# Patient Record
Sex: FEMALE
[De-identification: names, ages, dates, MRNs, and addresses within clinical notes are randomized per-mention and may not be internally consistent; named-entity substitution may affect disease eponyms.]

---

## 2017-12-10 ENCOUNTER — HOSPITAL ENCOUNTER (OUTPATIENT)
Dept: HOSPITAL 42 - ED | Age: 77
Setting detail: OBSERVATION
LOS: 1 days | Discharge: HOME | End: 2017-12-11
Attending: STUDENT IN AN ORGANIZED HEALTH CARE EDUCATION/TRAINING PROGRAM | Admitting: STUDENT IN AN ORGANIZED HEALTH CARE EDUCATION/TRAINING PROGRAM
Payer: MEDICARE

## 2017-12-10 VITALS — OXYGEN SATURATION: 100 %

## 2017-12-10 VITALS — BODY MASS INDEX: 15.1 KG/M2

## 2017-12-10 VITALS — RESPIRATION RATE: 18 BRPM

## 2017-12-10 DIAGNOSIS — R47.01: ICD-10-CM

## 2017-12-10 DIAGNOSIS — Z90.49: ICD-10-CM

## 2017-12-10 DIAGNOSIS — I73.9: ICD-10-CM

## 2017-12-10 DIAGNOSIS — E16.2: Primary | ICD-10-CM

## 2017-12-10 DIAGNOSIS — Z93.2: ICD-10-CM

## 2017-12-10 DIAGNOSIS — D63.8: ICD-10-CM

## 2017-12-10 DIAGNOSIS — I25.10: ICD-10-CM

## 2017-12-10 DIAGNOSIS — R47.1: ICD-10-CM

## 2017-12-10 DIAGNOSIS — I25.2: ICD-10-CM

## 2017-12-10 LAB
ALBUMIN/GLOB SERPL: 1.2 {RATIO} (ref 1.1–1.8)
ALP SERPL-CCNC: 480 U/L (ref 38–126)
ALT SERPL-CCNC: 52 U/L (ref 7–56)
APPEARANCE UR: (no result)
APTT BLD: 33.4 SECONDS (ref 25.1–36.5)
AST SERPL-CCNC: 147 U/L (ref 14–36)
BACTERIA #/AREA URNS HPF: (no result) /[HPF]
BASOPHILS # BLD AUTO: 0.05 K/MM3 (ref 0–2)
BASOPHILS NFR BLD: 0.5 % (ref 0–3)
BILIRUB SERPL-MCNC: 0.9 MG/DL (ref 0.2–1.3)
BILIRUB UR-MCNC: NEGATIVE MG/DL
BUN SERPL-MCNC: 18 MG/DL (ref 7–21)
CALCIUM SERPL-MCNC: 9 MG/DL (ref 8.4–10.5)
CHLORIDE SERPL-SCNC: 107 MMOL/L (ref 98–107)
CO2 SERPL-SCNC: 15 MMOL/L (ref 21–33)
COLOR UR: YELLOW
EOSINOPHIL # BLD: 0 10*3/UL (ref 0–0.7)
EOSINOPHIL NFR BLD: 0 % (ref 1.5–5)
ERYTHROCYTE [DISTWIDTH] IN BLOOD BY AUTOMATED COUNT: 18.7 % (ref 11.5–14.5)
GLOBULIN SER-MCNC: 3.3 GM/DL
GLUCOSE SERPL-MCNC: 45 MG/DL (ref 70–110)
GLUCOSE UR STRIP-MCNC: NEGATIVE MG/DL
GRANULOCYTES # BLD: 7.93 10*3/UL (ref 1.4–6.5)
GRANULOCYTES NFR BLD: 85 % (ref 50–68)
HCT VFR BLD CALC: 32.3 % (ref 36–48)
INR PPP: 0.99 (ref 0.93–1.08)
KETONES UR STRIP-MCNC: 40 MG/DL
LEUKOCYTE ESTERASE UR-ACNC: NEGATIVE LEU/UL
LYMPHOCYTES # BLD: 0.5 10*3/UL (ref 1.2–3.4)
LYMPHOCYTES NFR BLD AUTO: 4.8 % (ref 22–35)
MCH RBC QN AUTO: 39.3 PG (ref 25–35)
MCHC RBC AUTO-ENTMCNC: 33.1 G/DL (ref 31–37)
MCV RBC AUTO: 118.8 FL (ref 80–105)
MONOCYTES # BLD AUTO: 0.9 10*3/UL (ref 0.1–0.6)
MONOCYTES NFR BLD: 9.7 % (ref 1–6)
NEUTROPHILS NFR BLD AUTO: 86 % (ref 50–70)
PH UR STRIP: 5.5 [PH] (ref 4.7–8)
PLATELET # BLD: 492 10^3/UL (ref 120–450)
PMV BLD AUTO: 9.6 FL (ref 7–11)
POTASSIUM SERPL-SCNC: 5.5 MMOL/L (ref 3.6–5)
PROT SERPL-MCNC: 7.4 G/DL (ref 5.8–8.3)
PROT UR STRIP-MCNC: NEGATIVE MG/DL
RBC # UR STRIP: (no result) /UL
RBC #/AREA URNS HPF: (no result) /HPF (ref 0–2)
SODIUM SERPL-SCNC: 142 MMOL/L (ref 132–148)
SP GR UR STRIP: >= 1.03 (ref 1–1.03)
TROPONIN I SERPL-MCNC: 0.01 NG/ML
UROBILINOGEN UR STRIP-ACNC: 0.2 E.U./DL
WBC # BLD AUTO: 9.3 10^3/UL (ref 4.5–11)
WBC #/AREA URNS HPF: (no result) /HPF (ref 0–6)

## 2017-12-10 PROCEDURE — 71010: CPT

## 2017-12-10 PROCEDURE — 87086 URINE CULTURE/COLONY COUNT: CPT

## 2017-12-10 PROCEDURE — 85025 COMPLETE CBC W/AUTO DIFF WBC: CPT

## 2017-12-10 PROCEDURE — 85730 THROMBOPLASTIN TIME PARTIAL: CPT

## 2017-12-10 PROCEDURE — 36415 COLL VENOUS BLD VENIPUNCTURE: CPT

## 2017-12-10 PROCEDURE — 83036 HEMOGLOBIN GLYCOSYLATED A1C: CPT

## 2017-12-10 PROCEDURE — 70450 CT HEAD/BRAIN W/O DYE: CPT

## 2017-12-10 PROCEDURE — 84484 ASSAY OF TROPONIN QUANT: CPT

## 2017-12-10 PROCEDURE — 81001 URINALYSIS AUTO W/SCOPE: CPT

## 2017-12-10 PROCEDURE — 99285 EMERGENCY DEPT VISIT HI MDM: CPT

## 2017-12-10 PROCEDURE — 80053 COMPREHEN METABOLIC PANEL: CPT

## 2017-12-10 PROCEDURE — 80061 LIPID PANEL: CPT

## 2017-12-10 PROCEDURE — 93880 EXTRACRANIAL BILAT STUDY: CPT

## 2017-12-10 PROCEDURE — 93005 ELECTROCARDIOGRAM TRACING: CPT

## 2017-12-10 PROCEDURE — 82948 REAGENT STRIP/BLOOD GLUCOSE: CPT

## 2017-12-10 PROCEDURE — 85610 PROTHROMBIN TIME: CPT

## 2017-12-10 PROCEDURE — 96361 HYDRATE IV INFUSION ADD-ON: CPT

## 2017-12-10 PROCEDURE — 96360 HYDRATION IV INFUSION INIT: CPT

## 2017-12-10 NOTE — ED PDOC
Physical Exam


Vital Signs











  Temp Pulse Resp BP Pulse Ox


 


 12/10/17 09:10  98.1 F  72  19  120/70  97


 


 12/10/17 06:00  98.5 F  80  18  108/67  99











Temperature: Afebrile


Blood Pressure: Normal


Pulse: Regular


Respiratory Rate: Normal


Appearance: Positive for: Well-Appearing, Non-Toxic, Comfortable


Pain Distress: None


Mental Status: Positive for: Alert and Oriented X 3





- Systems Exam


Neurological: Present: GCS=15, CN II-XII Intact, Speech Normal, Motor Func 

Grossly Intact, Normal Sensory Function, Normal Cerebellar Funct, Memory Normal

, Normal 2Pt Descrimination





Medical Decision Making


ED Course and Treatment: 





12/10/17 07:00


Case was signed out to me by Dr. Thakur. Patient came into the emergency 

department for speech changes and left sided weakness. Currently pending labs 

and CT readings. 








12/10/17 07:10


Head CT: Creator : Giovanny Lake MD


FINDINGS:  


Limitations:  Motion artifact - mild.  


Brain:  Moderate atrophy.  No definite intracranial hemorrhage.  No mass. 

Minimal encephalomalacia within LEFT occipital region.  Few scattered foci of 

decreased attenuation within periventricular/subcortical white matter. Probable 

chronic infarct within RIGHT cerebellum.  No definite edema.  


Ventricles:  No hydrocephalus.  


Bones/joints:  No acute fracture.  


Soft tissues:  Unremarkable.  


Vasculature:  Atherosclerotic disease of intracranial arteries.  


Sinuses:  No acute sinusitis.  


Mastoid air cells:  Partial opacification of LEFT mastoid.  


Orbits:  Unremarkable as visualized.  


IMPRESSION:       


 1.  Nonspecific white matter changes.  Acute infarction may be CT occult 

within first 24 hours.  If a focal deficit persists, consider followup CT or 

MRI for further evaluation.  


 2.  Incidental/non-acute findings are described above.  


 


          





12/10/17 08:14


EKG:


Ordered, reviewed, and independently interpreted the EKG.


Rate : 76 BPM


Rhythm : NSR


Interpretation : No ST-segment elevations or depressions, no T-wave inversions, 

normal intervals.





12/10/17 08:52


CT head negative for acute infarct. Aspirin ordered. Patient found to be 

hypoglycemic and treated with dextrose one amp. Case discussed with Dr. AVINASH Edwards who will place her on his service. 





12/10/17 09:00


Chest X-ray


Impression: No active disease. Normal 





12/10/17 09:46


Case was discussed with Dr. Jeffrey, Neurology, who agrees with plan.





- Lab Interpretations


Lab Results: 








 12/10/17 06:20 





 12/10/17 06:20 





 Lab Results





12/10/17 07:30: POC Glucose (mg/dL) 272 H


12/10/17 07:09: POC Glucose (mg/dL) 56 L


12/10/17 06:30: Urine Color Yellow, Urine Appearance Sl cloudy, Urine pH 5.5, 

Ur Specific Gravity >= 1.030, Urine Protein Negative, Urine Glucose (UA) 

Negative, Urine Ketones 40 H, Urine Blood Trace-intact H, Urine Nitrate 

Positive H, Urine Bilirubin Negative, Urine Urobilinogen 0.2, Ur Leukocyte 

Esterase Negative, Urine RBC 0 - 2, Urine WBC 0 - 2, Ur Epithelial Cells 1 - 3, 

Urine Bacteria Many


12/10/17 06:20: Sodium 142, Potassium 5.5 H, Chloride 107, Carbon Dioxide 15 L, 

Anion Gap 25 H, BUN 18, Creatinine 0.6 L, Est GFR ( Amer) > 60, Est GFR (

Non-Af Amer) > 60, Random Glucose 45 L* D, Calcium 9.0, Total Bilirubin 0.9, 

 H, ALT 52, Alkaline Phosphatase 480 H, Troponin I 0.01, Total Protein 

7.4, Albumin 4.0, Globulin 3.3, Albumin/Globulin Ratio 1.2


12/10/17 06:20: PT 10.9, INR 0.99, APTT 33.4


12/10/17 06:20: WBC 9.3, RBC 2.72 L, Hgb 10.7 L, Hct 32.3 L, .8 H, MCH 

39.3 H, MCHC 33.1, RDW 18.7 H, Plt Count 492 H, MPV 9.6, Gran % 85.0 H, Lymph % 

(Auto) 4.8 L, Mono % (Auto) 9.7 H, Eos % (Auto) 0.0 L, Baso % (Auto) 0.5, Gran 

# 7.93 H, Lymph # 0.5 L, Mono # 0.9 H, Eos # 0.0, Baso # 0.05, Neutrophils % (

Manual) 86 H, Lymphocytes % (Manual) 4 L, Monocytes % (Manual) 10 H











- RAD Interpretation


Radiology Orders: 








12/10/17 06:17


HEAD W/O (CODE STROKE) [CT] Stat 





12/10/17 08:42


CXR [CHEST PORTABLE] [RAD] Stat 











: ED Physician





- Medication Orders


Current Medication Orders: 











Discontinued Medications





Aspirin (Aspirin)  325 mg PO STAT STA


   Stop: 12/10/17 08:50


   Last Admin: 12/10/17 08:56  Dose: 325 mg





Dextrose (Dextrose 50% Inj)  50 ml IVP STAT STA


   Stop: 12/10/17 07:13


   Last Admin: 12/10/17 08:11  Dose:  





Sodium Chloride (Sodium Chloride 0.9%)  1,000 mls @ 500 mls/hr IV .Q2H STA


   Stop: 12/10/17 08:16


   Last Admin: 12/10/17 06:43  Dose: 500 mls/hr





eMAR Start Stop


 Document     12/10/17 06:43  SS  (Rec: 12/10/17 06:43  SS  [id number])


     Intravenous Solution


      Start Date                                 12/10/17


      Start Time                                 06:43


      End Date                                   12/10/17


      End time                                   08:43


      Total Infusion Time                        120





Sodium Polystyrene Sulfonate (Kayexalate Susp)  30 gm PO STAT STA


   Stop: 12/10/17 07:16


   Last Admin: 12/10/17 08:00  Dose: 30 gm











- Scribe Statement


The provider has reviewed the documentation as recorded by the Kashif Hui





Provider Scribe Attestation:


All medical record entries made by the Scribe were at my direction and 

personally dictated by me. I have reviewed the chart and agree that the record 

accurately reflects my personal performance of the history, physical exam, 

medical decision making, and the department course for this patient. I have 

also personally directed, reviewed, and agree with the discharge instructions 

and disposition. 





Disposition/Present on Arrival





- Present on Arrival


Any Indicators Present on Arrival: No


History of DVT/PE: No


History of Uncontrolled Diabetes: No


Urinary Catheter: No


History of Decub. Ulcer: No


History Surgical Site Infection Following: None





- Disposition


Have Diagnosis and Disposition been Completed?: Yes


Diagnosis: 


 Hypoglycemia





Disposition: HOSPITALIZED


Disposition Time: 08:53


Patient Plan: Observation


Condition: FAIR





NIHSS Stroke Scale





- Date/Time Evaluation Performed


Date Performed: 12/10/17


Time Performed: 07:00


When Was NIHSS Performed: Baseline





- How Severe is the Stroke


Level of Consciousness: 0=Alert


LOC to Questions: 0=Both comments correct


LOC to commands: 0=Obeys both correctly


Best Gaze: 0=Normal


Visual: 0=No visual loss


Facial: 0=Normal


Motor Arm - Left: 0=No drift


Motor Arm - Right: 0=No drift


Motor Leg - Left: 0=No drift


Motor Leg - Right: 0=No drift


Limb Ataxia: 0=Absent


Sensory: 0=Normal


Best Language: 0=No aphasia


Dysarthia: 0=Normal articulation


Extinction & Inattention (Neglect): 0=Normal, no object


Score: 0





rTPA Inclusion/Exclusion





- Refusal of Treatment


Patient Refused Treatment: No





- Inclusion Criteria for Altepase


Patient is 18 years or Older: Yes


The Clinical Diagnosis of Ischemic Stroke That is Causing a Potentially 

Disabling Neurological Deficit: No


Time of Onset is Well Established to be Less Than 270 Minute Before Treatment 

Would Begin: Yes


Risk/Benefit Discussed With Patient/Family Member Present: No





- Warning to TPA With Conditions


Following Conditions Weighed Against Anticipated Benefit: Yes


Condition: Stroke Serevity Too Mild, Rapid Improvement

## 2017-12-10 NOTE — CARD
--------------- APPROVED REPORT --------------





EKG Measurement

Heart [id number]

[place] [id number]

[id number]

[id number]

[id number]



<Conclusion>

Normal sinus rhythm

Normal ECG

## 2017-12-10 NOTE — ED PDOC
Arrival/HPI





- General


Chief Complaint: Weakness/Neurological Deficit


Time Seen by Provider: 12/10/17 05:57


Historian: Patient





- History of Present Illness


Narrative History of Present Illness (Text): 





12/10/17 06:13


Pt is a 78 yo who lives at home with her disabled .Staes that earlier 

today was having trouble getting the words out.Staes she also felt L sided wkns 

at that time.Called EMS ,pt states that the wkns and inability to speak 

resolved on way to hospital.Pt denies headache,denies CP or SOB


Time/Duration: Prior to Arrival


Symptom Onset: Sudden


Symptom Course: Resolved


Quality: Other


Severity Level: Severe


Activities at Onset: Rest


Associated Symptoms (Text): 





12/10/17 06:16


Pt also states she felt transient numbness to the L side of her body which has 

since resolved





Past Medical History





- Infectious Disease


Hx of Infectious Diseases: None





- Cardiac


Hx Cardiac Disorders: Yes (mi)


Hx Congestive Heart Failure: Yes


Other/Comment: angioplasty and stent placement for aortic occlusion 5/28/16





- Pulmonary


Hx Respiratory Disorders: No





- Neurological


Hx Neurological Disorder: Yes


HX Cerebrovascular Accident: Yes





- HEENT


Hx HEENT Disorder: Yes (eyeglasses)





- Renal


Hx Renal Disorder: No





- Endocrine/Metabolic


Hx Endocrine Disorders: No





- Hematological/Oncological


Hx Blood Disorders: Yes (blood transfusion 5/2016)


Hx Anemia: Yes (IRON DEFICIENCY ANEMIA)





- Integumentary


Hx Dermatological Disorder: Yes


Other/Comment: b/l arms thin skin with multiple redish purple skin 

discolorations, slight redness posterior r lower buttock and thigh, l lower abd 

1cm x 0.5cm red wound which developed post colostomy reversal sx small amt dng 

noted,left hip healed red wound, stage 2 1cm round sacral wound clean and dry 

covered with optifoam,r arm skin tear, left 2nd toe red swollen red dark brown 

1cm round dry scab, rash to top of left foot, rash to top right foot, dry brown 

scab 0.5cm round to left knee, with 3 areas of redness to lle, redness to right 

knee with multiple areas of red skin, abd scar.





- Musculoskeletal/Rheumatological


Hx Falls: No


Hx Unsteady Gait: Yes





- Gastrointestinal


Hx Gastrointestinal Disorders: Yes (hc c dif 12/28/16)


Hx Colostomy: Yes (reversal 11/10/16)


Hx Diverticulitis: Yes


Other/Comment: hx GI bleed ischemic bowel, ischemic colitis.  NURSING HOME 

CHART STATES SHE HAS ABSCESS ON ABD. WALL





- Genitourinary/Gynecological


Hx Genitourinary Disorders: Yes


Hx Urinary Tract Infection: Yes





- Psychiatric


Hx Psychophysiologic Disorder: No


Hx Emotional Abuse: No


Hx Physical Abuse: No


Hx Substance Use: No





- Surgical History


Hx Appendectomy: Yes


Hx Hysterectomy: Yes


Other/Comment: colostomy reversal 11/10/16, r hemicolectomy 5/20/16 with 

colostomy





- Anesthesia


Hx Anesthesia: No


Hx Anesthesia Reactions: No


Hx Malignant Hyperthermia: No





- Suicidal Assessment


Feels Threatened In Home Enviroment: No





Family/Social History


Smoking Status: Never Smoked


Hx Alcohol Use: No


Hx Substance Use: No





Allergies/Home Meds


Allergies/Adverse Reactions: 


Allergies





No Known Allergies Allergy (Verified 12/10/17 05:53)


 








Home Medications: 


 Home Meds











 Medication  Instructions  Recorded  Confirmed


 


Iron,Carbonyl [Feosol] 65 mg PO DAILY 11/03/16 12/10/17


 


Pantoprazole [Protonix EC Tab] 40 mg PO DAILY 11/03/16 12/10/17


 


traMADol [Ultram] 50 mg PO PRN PRN 11/03/16 12/10/17














Physical Exam





Vital Signs











  Temp Pulse Resp BP Pulse Ox


 


 12/10/17 06:00  98.5 F  80  18  108/67  99














Disposition/Present on Arrival





- Present on Arrival


History of DVT/PE: No


History of Uncontrolled Diabetes: No


Urinary Catheter: No


History of Decub. Ulcer: No


History Surgical Site Infection Following: None





- Disposition

## 2017-12-10 NOTE — CT
EXAM:

  CT Head Without Intravenous Contrast



CLINICAL HISTORY:

  77 years old, female; Signs and symptoms; Other: TIA



TECHNIQUE:

  Axial computed tomography images of the head/brain without intravenous 

contrast.  All CT scans at this facility use one or more dose reduction 

techniques, viz.: automated exposure control; ma/kV adjustment per patient size 

(including targeted exams where dose is matched to indication; i.e. head); or 

iterative reconstruction technique.



COMPARISON:

  No relevant prior studies available.



FINDINGS:

  Limitations:  Motion artifact - mild.

  Brain:  Moderate atrophy.  No definite intracranial hemorrhage.  No mass.  

Minimal encephalomalacia within LEFT occipital region.  Few scattered foci of 

decreased attenuation within periventricular/subcortical white matter.  

Probable chronic infarct within RIGHT cerebellum.  No definite edema.

  Ventricles:  No hydrocephalus.

  Bones/joints:  No acute fracture.

  Soft tissues:  Unremarkable.

  Vasculature:  Atherosclerotic disease of intracranial arteries.

  Sinuses:  No acute sinusitis.

  Mastoid air cells:  Partial opacification of LEFT mastoid.

  Orbits:  Unremarkable as visualized.



IMPRESSION:     

1.  Nonspecific white matter changes.  Acute infarction may be CT occult within 

first 24 hours.  If a focal deficit persists, consider followup CT or MRI for 

further evaluation.

2.  Incidental/non-acute findings are described above.

## 2017-12-10 NOTE — HP
HISTORY OF PRESENT ILLNESS:  The patient is a 77-year-old woman with a past 
medical history of CAD s/p NSTEMI, PVD and anemia of chronic disease who 
presented to [facility] for evaluation of a 1 day history of sudden 
onset dysarthria and left sided weakness.  The patient reported to be in her 
usual state of health until the onset of symptoms, which were sudden in nature, 
and by the time of arrival to the ED she was noted to have resolution of her 
symptoms.  She denied any preceding fevers, chills, rigors or URI type 
symptoms.  At present she reports being back to her baseline status.



PAST MEDICAL HISTORY:  As per HPI, also history of ischemic colitis s/p right 
hemicolectomy s/p ileostomy with reversal



PAST SURGICAL HISTORY:  As per HPI.



MEDICATIONS:  Aspirin 81 mg p.o. daily, Lipitor 80 mg p.o. daily.



ALLERGIES:  NO KNOWN DRUG ALLERGIES.



FAMILY HISTORY:  Noncontributory.



SOCIAL HISTORY:  The patient denies any toxic habits.



REVIEW OF SYSTEMS:  A 14-point review of systems is negative except as per HPI.



PHYSICAL EXAMINATION:

VITAL SIGNS:  Temperature 98.1, pulse 72, blood pressure 120/70, respiratory 
rate 19, oxygen saturation 97% on room air.

GENERAL:  Elderly woman, appearing her stated age, in no apparent distress.

HEENT:  Normocephalic, atraumatic.  PERRL, EOMI.  No scleral icterus.  Mild 
conjunctival pallor is noted.

NECK:  No JVD.  No bruits.

LUNGS:  Clear to auscultation.

CARDIOVASCULAR:  Regular rate and rhythm.

ABDOMEN:  Normoactive bowel sounds.  Soft, nontender and nondistended.

EXTREMITIES:  No edema.

NEUROLOGIC:  Awake, alert and oriented x 3.  No focal motor deficits.  CN II-
XII intact.



LABORATORY DATA:  

WBC 9.3 with 85% neutrophils, hemoglobin 10.7, hematocrit 32, platelets 492.  

Sodium 142, potassium 5.5, chloride 107, bicarb 15, BUN 18, creatinine 0.6, 
glucose 45.



IMAGING STUDIES:

CT of the head without contrast demonstrated nonspecific white matter changes 
but otherwise no acute intracranial pathology.



ASSESSMENT:  The patient is a 77 year old woman with past medical history of 
CAD s/p NSTEMI, PVD, mesenteric ischemia s/p right hemicolectomy s/p ileostomy 
with reversal who presented with a 1 day history of sudden onset of left-sided 
weakness and dysarthria with spontaneous resolution of symptoms, who 
subsequently admitted to the general medical dee for continued management of 
possible TIA and symptomatic hypoglycemia.



PLAN:

1.  TIA.  CT of the head is reviewed and demonstrates no acute intracranial 
pathology.  Dr. Murphy of Neurology has been consulted.  We will obtain carotid 
artery ultrasound.  Continue aspirin 81 mg p.o. daily, Lipitor 80 mg p.o. 
daily.  Continue to monitor neuro status q. 4 hours.

2.  Hypoglycemia.  Etiology likely secondary to poor p.o. intake as the patient 
states that she has not been eating well over the last several days.  We will 
continue to monitor fingersticks before every meal and at bedtime.

3.  CAD s/p NSTEMI.  Continue aspirin 81 mg p.o. daily and Lipitor 80 mg p.o. 
daily.

4.  Peripheral vascular disease.  Continue aspirin 81 mg p.o. daily and Lipitor 
80 mg p.o. daily.

5.  Prophylaxis.  GI prophylaxis is not indicated as the patient is eating. DVT 
prophylaxis is not indicated as the patient is ambulatory.



CODE STATUS:  Full code.



__________________________________________

Jose Elias Edwards MD





DD:  12/10/2017 9:54:10

DT:  12/10/2017 10:16:22

Job # [id number]



MTDD

## 2017-12-10 NOTE — CARD
--------------- APPROVED REPORT --------------





EKG Measurement

Heart [id number]

CO [id number]

[id number]

[id number]

[id number]



<Conclusion>

Sinus rhythm with occasional and consecutive premature ventricular 

complexes and fusion complexes

Nonspecific ST and T wave abnormality

Prolonged QT

Abnormal ECG

## 2017-12-10 NOTE — RAD
HISTORY:

Aphasia



COMPARISON:

No prior. 



FINDINGS:



LUNGS:

Hyperinflation with paucity of interstitial markings in the upper 

lobes consistent with underlying emphysema. Coarsened/reticular 

appearance of the interstitial markings both mid to lower lung zones 

again noted. 



Probable mild scarring left lung base. 



PLEURA:

Mild biapical pleural thickening No significant pleural effusion 

identified, no pneumothorax apparent.



CARDIOVASCULAR:

Normal.



OSSEOUS STRUCTURES:

No significant abnormalities.



VISUALIZED UPPER ABDOMEN:

Normal.



OTHER FINDINGS:

None.



IMPRESSION:

Hyperinflation with paucity of interstitial markings in the upper 

lobes consistent with underlying emphysema. Coarsened/reticular 

appearance of the interstitial markings both mid to lower lung zones 

again noted. 



Probable mild scarring left lung base.

## 2017-12-11 VITALS — TEMPERATURE: 98.7 F | SYSTOLIC BLOOD PRESSURE: 93 MMHG | DIASTOLIC BLOOD PRESSURE: 57 MMHG

## 2017-12-11 VITALS — HEART RATE: 98 BPM

## 2017-12-11 LAB
ALBUMIN/GLOB SERPL: 1.1 {RATIO} (ref 1.1–1.8)
ALP SERPL-CCNC: 442 U/L (ref 38–126)
ALT SERPL-CCNC: 46 U/L (ref 7–56)
AST SERPL-CCNC: 96 U/L (ref 14–36)
BASOPHILS # BLD AUTO: 0.03 K/MM3 (ref 0–2)
BASOPHILS NFR BLD: 0.4 % (ref 0–3)
BILIRUB SERPL-MCNC: 0.9 MG/DL (ref 0.2–1.3)
BUN SERPL-MCNC: 14 MG/DL (ref 7–21)
CALCIUM SERPL-MCNC: 8.4 MG/DL (ref 8.4–10.5)
CHLORIDE SERPL-SCNC: 105 MMOL/L (ref 98–107)
CHOLEST SERPL-MCNC: 164 MG/DL (ref 130–200)
CO2 SERPL-SCNC: 27 MMOL/L (ref 21–33)
EOSINOPHIL # BLD: 0 10*3/UL (ref 0–0.7)
EOSINOPHIL NFR BLD: 0.4 % (ref 1.5–5)
ERYTHROCYTE [DISTWIDTH] IN BLOOD BY AUTOMATED COUNT: 18.6 % (ref 11.5–14.5)
GLOBULIN SER-MCNC: 2.7 GM/DL
GLUCOSE SERPL-MCNC: 95 MG/DL (ref 70–110)
GRANULOCYTES # BLD: 5.44 10*3/UL (ref 1.4–6.5)
GRANULOCYTES NFR BLD: 74.6 % (ref 50–68)
HCT VFR BLD CALC: 26.7 % (ref 36–48)
LYMPHOCYTES # BLD: 0.9 10*3/UL (ref 1.2–3.4)
LYMPHOCYTES NFR BLD AUTO: 11.7 % (ref 22–35)
MCH RBC QN AUTO: 38.5 PG (ref 25–35)
MCHC RBC AUTO-ENTMCNC: 33.3 G/DL (ref 31–37)
MCV RBC AUTO: 115.6 FL (ref 80–105)
MONOCYTES # BLD AUTO: 0.9 10*3/UL (ref 0.1–0.6)
MONOCYTES NFR BLD: 12.9 % (ref 1–6)
PLATELET # BLD: 379 10^3/UL (ref 120–450)
PMV BLD AUTO: 9.1 FL (ref 7–11)
POTASSIUM SERPL-SCNC: 3 MMOL/L (ref 3.6–5)
PROT SERPL-MCNC: 5.7 G/DL (ref 5.8–8.3)
SODIUM SERPL-SCNC: 138 MMOL/L (ref 132–148)
WBC # BLD AUTO: 7.3 10^3/UL (ref 4.5–11)

## 2017-12-11 NOTE — CP.PCM.PN
Subjective





- Date & Time of Evaluation


Date of Evaluation: 12/11/17


Time of Evaluation: 09:00





- Subjective


Subjective: 


PGY-2 Neurology progress note for Dr. Murphy's service





Patient slime nd examined at bedside, no acute distress, she is resting 

comfortably. Patient is alter and oriented to person, place and situation. She 

states that she aphasia and weakness have completely resolved. Denies any fever

, chills, headaches, dizziness, chest pain. 











Objective





- Vital Signs/Intake and Output


Vital Signs (last 24 hours): 


 











Temp Pulse Resp BP Pulse Ox


 


 98.7 F   98 H  18   93/57 L  100 


 


 12/11/17 08:06  12/11/17 10:00  12/11/17 08:06  12/11/17 08:06  12/11/17 08:06








Intake and Output: 


 











 12/11/17 12/11/17





 06:59 18:59


 


Intake Total 540 


 


Balance 540 














- Medications


Medications: 


 Current Medications





Acetaminophen (Tylenol 325mg Tab)  650 mg PO Q6H PRN


   PRN Reason: Pain, moderate (4-7)


   Last Admin: 12/10/17 21:37 Dose:  650 mg


Aspirin (Aspirin Chewable)  81 mg PO DAILY [facility]


   Last Admin: 12/11/17 09:05 Dose:  81 mg


Atorvastatin Calcium (Lipitor)  80 mg PO DIN [facility]


   Last Admin: 12/10/17 17:22 Dose:  80 mg











- Labs


Labs: 


 





 12/11/17 06:30 





 12/11/17 06:30 





 











PT  10.9 SECONDS (9.4-12.5)   12/10/17  06:20    


 


INR  0.99  (0.93-1.08)   12/10/17  06:20    


 


APTT  33.4 Seconds (25.1-36.5)   12/10/17  06:20    














- Constitutional


Appears: Well, No Acute Distress





- Head Exam


Head Exam: ATRAUMATIC, NORMAL INSPECTION, NORMOCEPHALIC





- Eye Exam


Eye Exam: EOMI, Normal appearance





- ENT Exam


ENT Exam: Mucous Membranes Moist





- Respiratory Exam


Respiratory Exam: Clear to Ausculation Bilateral, NORMAL BREATHING PATTERN.  

absent: Respiratory Distress





- Cardiovascular Exam


Cardiovascular Exam: REGULAR RHYTHM





- Extremities Exam


Extremities Exam: Normal Inspection





- Neurological Exam


Neurological Exam: Alert, Awake, CN II-XII Intact, Oriented x3


Neuro motor strength exam: Left Upper Extremity: 5, Right Upper Extremity: 5, 

Left Lower Extremity: 5, Right Lower Extremity: 5





- Skin


Skin Exam: Dry, Intact, Normal Color, Warm





Assessment and Plan





- Assessment and Plan (Free Text)


Assessment: 


78 yo female with PMH of CAD, MI, present with transient aphasia and left sided 

weakness most likely TIA





- head CT negative


- carotid ultrasound 60-75% stenosis in bilateral proximal ICA


- continue asa 81mg, Lipitor 80mg 





Case reviewed and discussed with attending

## 2017-12-11 NOTE — CON
DATE:



HISTORY OF PRESENT ILLNESS:  This is a 77-year-old white female with past

medical history not significant, came to hospital with complaint of aphasia

and weakness, and found to have hypoglycemia and symptoms have improved

coming to hospital.  Called to evaluate the patient.  CAT scan of the head

was done, which was reported negative.



PAST MEDICAL HISTORY:  The patient also has past medical history of

coronary artery disease, MI, anemia of chronic disease, and the patient had

sudden onset of dysarthria and left-sided weakness.



SOCIAL HISTORY:  Does not smoke, does not drink.



REVIEW OF SYSTEMS:  A 10-point review of systems was negative except

weakness on the right side and dysphasia.



PHYSICAL EXAMINATION

HEENT:  Normocephalic and atraumatic.

NECK:  Supple.

NEUROLOGIC:  Alert, awake oriented to self and place.  Cranial nerves II to

XII were tested.  Pupils reactive.  Bilateral bilateral adenoidectomy.  No

facial asymmetry.  Tongue midline.  Motor examination:  Moves all the

extremities spontaneously and equally.  Tone normal.  Deep tendon reflexes

1+.  Both plantars are downgoing.  Sensory appears intact.  Cerebellar;

gait deferred.



IMPRESSION:  A 77-year-old with past medical history of coronary artery

disease and myocardial infarction who came to the hospital with sudden

onset of left-sided weakness, dysarthria and aphasia.  Started improving

coming to the emergency room.  CAT scan of the head was done, which was

reported negative.  Workup in progress possibly secondary to hypoglycemia

and symptoms improved.  We will followup.







__________________________________________

Juan C Murphy MD





DD:  12/10/2017 15:00:16

DT:  12/10/2017 22:57:48

Job # [id number]

## 2017-12-11 NOTE — US
PROCEDURE:  



HISTORY:

Carotid stenosis 



PHYSICIAN(S):  Mathew Seaman MD.



TECHNIQUE:

Duplex sonography and color-flow Doppler were used to evaluate the 

carotid bifurcations and limited segments of the vertebral arteries 

bilaterally.



FINDINGS:

There is extensive heterogeneous echogenic plaque with shadowing 

noted at the carotid bifurcations bilaterally. The peak systolic 

velocity in the proximal right internal carotid artery is 190 cm/sec. 

This corresponds to a 60-79 percent proximal right ICA stenosis. 

Moderately elevated systolic velocities are noted in the proximal 

right external carotid artery. There is antegrade flow in the right 

vertebral artery.



The peak systolic velocity in the proximal left internal carotid 

artery is 217 cm/sec. The end-diastolic velocity at this position is 

14 cm/second This corresponds to a 60-79 percent proximal left ICA 

stenosis. Mildly elevated systolic velocities are noted in the 

proximal left external carotid artery. There is antegrade flow in the 

left vertebral artery.



IMPRESSION:

1. Bilateral 60-79 percent proximal ICA stenoses. 



2. Antegrade flow in both vertebral arteries.

## 2017-12-11 NOTE — PN
SUBJECTIVE:  The patient was seen and examined at bedside on the remote 
telemetry dee.  No acute events overnight.  She remains afebrile and 
hemodynamically stable.  The patient reports complete resolution of her 
presenting symptoms, which consisted of dysarthria and left-sided weakness. 
This morning she states she feels great and that she is back to her baseline 
and is asking when she will be discharged home.



OBJECTIVE:

VITAL SIGNS:  Temperature 98.7, pulse 65, blood pressure 93/57, respiratory 
rate 18, oxygen saturation 100% on room air.

GENERAL:  Elderly woman appearing her stated age, lying in bed in no apparent 
distress.

HEENT:  PERRL.  EOMI.  No scleral icterus.  Mild conjunctival pallor is noted.

NECK:  No JVD.  No bruits.

LUNGS:  Clear to auscultation.

CARDIOVASCULAR:  Regular rate and rhythm.  Normal S1 and S2.

ABDOMEN:  Normoactive bowel sounds.  Soft, nontender, and nondistended.

EXTREMITIES:  No edema.

NEUROLOGIC:  Awake, alert and oriented x3.  No focal motor deficits.



LABORATORY DATA:  

WBC 7.3, hemoglobin 8.9, hematocrit 26.7, and platelets 379.  .  

Chemistry reviewed and unremarkable with exception of potassium of 3.



ASSESSMENT:  The patient is a 77 year old woman with a past medical history of 
CAD s/p NSTEMI, PVD and mesenteric ischemia s/p right hemicolectomy s/p 
ileostomy with reversal who presented with a 1 day history of sudden onset of 
left-sided weakness and dysarthria with spontaneous resolution of symptoms who 
was subsequently admitted to the remote telemetry dee for continued management 
of possible TIA and symptomatic hypoglycemia.



PLAN:

1.  TIA.  Input from Dr. Murphy of neurology noted.  CT of the head 
demonstrated no acute pathology.  Carotid U/S is pending.  Continue with 
Aspirin 81 mg p.o. daily and Lipitor 80 mg p.o. daily.

2.  Hypoglycemia, likely secondary to poor p.o. intake, resolved.  The patient'
s fingersticks have been reviewed and demonstrate satisfactory glucose values.  
Continue to encourage p.o. intake.

3.  CAD s/p NSTEMI.  Continue aspirin 81 mg p.o. daily and Lipitor 80 mg p.o. 
daily.

4.  Peripheral vascular disease.  Continue aspirin 81 mg p.o. daily and Lipitor 
80 mg p.o. daily.

5.  Prophylaxis.  GI prophylaxis is not indicated as the patient is eating. DVT 
prophylaxis is not indicated as the patient is ambulatory.

6.  Disposition:  The patient likely discharged home today pending carotid 
ultrasonography reports.



CODE STATUS:  Full code.



__________________________________________

Jose Elias Edwards MD





DD:  12/11/2017 8:26:36

DT:  12/11/2017 8:42:47

Job # [id number]





MTDD

## 2017-12-13 NOTE — DS
ADMITTING DIAGNOSES:  Transient ischemic attack, symptomatic hypoglycemia.



DISCHARGE DIAGNOSES:  Transient ischemic attack, symptomatic hypoglycemia

(resolved).



SECONDARY DIAGNOSES:  Coronary artery disease, status post non-ST elevation

myocardial infarction, peripheral vascular disease, ischemic colitis,

status post right hemicolectomy, and status post ileostomy with reversal.



CONSULTATIONS:  Dr. Murphy (Neurology).



IMAGING STUDIES:

1.  Chest x-ray, which demonstrated hyperinflation consistent with

emphysema, but otherwise no acute pathology.

2.  CT of the head without contrast, which demonstrated nonspecific white

matter changes, but no acute pathology.



DIAGNOSTIC STUDIES:  Carotid and vertebral artery ultrasound demonstrated

bilateral 60% to 79% proximal ICA stenosis.



HISTORY OF PRESENT ILLNESS:  The patient is a 77-year-old woman with a past

medical history of CAD, status post non-ST elevation MI, PVD, and anemia of

chronic disease, who presented to [facility] for evaluation of

a one-day history of sudden onset of dysarthria and left-sided weakness. 

The patient reported to be in her usual state of health until the onset of

symptoms, which were sudden in nature and by the time of arrival to the ED,

she was noted to have resolution of her symptoms.  She denied any preceding

fevers, chills, rigors, or URI type symptoms.  Of note, the patient did

report poor appetite for several days with decreased p.o. intake, but

otherwise did not know any other potential contributing factors.  By the

time of evaluation in the ED, she was back to her baseline functional

status.  Given her age and comorbidities as well as her presenting

symptoms, she was admitted to the remote telemetry dee for further

neurological workup of suspected TIA.



HOSPITAL COURSE:  Upon admission to the remote telemetry dee, the patient

was evaluated by Dr. Murphy of Neurology.  She was also started on IV fluid

hydration.  Given her presentation with symptomatic hypoglycemia,

fingersticks over the next 24 hours demonstrated satisfactory levels as the

patient was eating well.  She denied any neurological deficits during her

hospital stay and a carotid ultrasound, which was obtained as a part of TIA

workup was unremarkable.  Given the resolution of her symptoms, the patient

was deemed stable for discharge to home the following day.



CONDITION:  Good, improved.



DISPOSITION:  Home.



DISCHARGE MEDICATIONS:  Aspirin 81 mg p.o. daily and Lipitor 80 mg p.o.

daily.



DISCHARGE INSTRUCTIONS:  The patient was advised that If she has any

recurrence of her symptoms to presented to her PMD or to the nearest

Emergency Department immediately.  The patient was also advised to eat 3

meals a day so as to reduce the risk of further hypoglycemic episodes.  She

was also counseled on the signs and symptoms of hypoglycemia.



FOLLOWUP:  The patient is to follow up with her PMD within one week of

discharge.





__________________________________________

Jose Elias Edwards MD





DD:  12/12/2017 8:40:36

DT:  12/12/2017 9:35:23

Job # [id number]

## 2018-03-13 ENCOUNTER — HOSPITAL ENCOUNTER (INPATIENT)
Dept: HOSPITAL 42 - ED | Age: 78
LOS: 7 days | Discharge: NURSING, REHAB OR LONG-TERM CARE | DRG: 480 | End: 2018-03-20
Attending: STUDENT IN AN ORGANIZED HEALTH CARE EDUCATION/TRAINING PROGRAM | Admitting: STUDENT IN AN ORGANIZED HEALTH CARE EDUCATION/TRAINING PROGRAM
Payer: MEDICARE

## 2018-03-13 VITALS — BODY MASS INDEX: 16.2 KG/M2

## 2018-03-13 DIAGNOSIS — S72.141A: Primary | ICD-10-CM

## 2018-03-13 DIAGNOSIS — I34.0: ICD-10-CM

## 2018-03-13 DIAGNOSIS — I50.9: ICD-10-CM

## 2018-03-13 DIAGNOSIS — J18.9: ICD-10-CM

## 2018-03-13 DIAGNOSIS — E86.0: ICD-10-CM
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
## 2018-03-13 DIAGNOSIS — R09.02: ICD-10-CM

## 2018-03-13 DIAGNOSIS — L89.152: ICD-10-CM

## 2018-03-13 DIAGNOSIS — I26.99: ICD-10-CM

## 2018-03-13 DIAGNOSIS — I73.9: ICD-10-CM

## 2018-03-13 DIAGNOSIS — Z87.440: ICD-10-CM

## 2018-03-13 DIAGNOSIS — E87.2: ICD-10-CM

## 2018-03-13 DIAGNOSIS — R40.2412: ICD-10-CM

## 2018-03-13 DIAGNOSIS — S72.21XA: ICD-10-CM

## 2018-03-13 DIAGNOSIS — I25.10: ICD-10-CM

## 2018-03-13 DIAGNOSIS — D63.8: ICD-10-CM

## 2018-03-13 DIAGNOSIS — F17.200: ICD-10-CM

## 2018-03-13 DIAGNOSIS — I25.2: ICD-10-CM

## 2018-03-13 LAB
ALBUMIN SERPL-MCNC: 3.1 G/DL (ref 3–4.8)
ALBUMIN/GLOB SERPL: 1 {RATIO} (ref 1.1–1.8)
ALT SERPL-CCNC: 44 U/L (ref 7–56)
APTT BLD: 31 SECONDS (ref 25.1–36.5)
AST SERPL-CCNC: 99 U/L (ref 14–36)
BASOPHILS # BLD AUTO: 0.05 K/MM3 (ref 0–2)
BASOPHILS NFR BLD: 0.5 % (ref 0–3)
BUN SERPL-MCNC: 12 MG/DL (ref 7–21)
CALCIUM SERPL-MCNC: 8.9 MG/DL (ref 8.4–10.5)
EOSINOPHIL # BLD: 0 10*3/UL (ref 0–0.7)
EOSINOPHIL NFR BLD: 0 % (ref 1.5–5)
ERYTHROCYTE [DISTWIDTH] IN BLOOD BY AUTOMATED COUNT: 17.4 % (ref 11.5–14.5)
GFR NON-AFRICAN AMERICAN: > 60
GRANULOCYTES # BLD: 7.26 10*3/UL (ref 1.4–6.5)
GRANULOCYTES NFR BLD: 78.7 % (ref 50–68)
HGB BLD-MCNC: 9.6 G/DL (ref 12–16)
INR PPP: 0.91 (ref 0.93–1.08)
LYMPHOCYTES # BLD: 0.7 10*3/UL (ref 1.2–3.4)
LYMPHOCYTES NFR BLD AUTO: 8 % (ref 22–35)
MCH RBC QN AUTO: 37.6 PG (ref 25–35)
MCHC RBC AUTO-ENTMCNC: 32.4 G/DL (ref 31–37)
MCV RBC AUTO: 116.1 FL (ref 80–105)
MONOCYTES # BLD AUTO: 1.2 10*3/UL (ref 0.1–0.6)
MONOCYTES NFR BLD: 12.8 % (ref 1–6)
PLATELET # BLD: 345 10^3/UL (ref 120–450)
PMV BLD AUTO: 10.3 FL (ref 7–11)
PROTHROMBIN TIME: 10.4 SECONDS (ref 9.4–12.5)
RBC # BLD AUTO: 2.55 10^6/UL (ref 3.5–6.1)
TROPONIN I SERPL-MCNC: < 0.01 NG/ML
WBC # BLD AUTO: 9.2 10^3/UL (ref 4.5–11)

## 2018-03-13 RX ADMIN — MORPHINE SULFATE PRN MG: 4 INJECTION, SOLUTION INTRAMUSCULAR; INTRAVENOUS at 22:47

## 2018-03-13 RX ADMIN — MORPHINE SULFATE PRN MG: 4 INJECTION, SOLUTION INTRAMUSCULAR; INTRAVENOUS at 17:34

## 2018-03-13 NOTE — CON
DATE:



ORTHOPEDIC CONSULT



HISTORY OF PRESENT ILLNESS:  Patient is a 77-year-old female admitted to

[facility] to the ER on 03/13/2018 for being at home where she fell a

day before she was admitted and broke her right hip and she was found by

her friends where they live and brought to the emergency room in a state of

dehydration and pain.  X-ray shows intertrochanteric and subtrochanteric

fracture of right hip displaced, will need open reduction and internal

fixation to get rid of her pain and as long as she could tolerate the

surgery and she get a consult from Dr. Jose Elias Edwards and Dr. Christianson. 

Once sets her pain, we could do a right hip pinning with a [employer] peritroch

linda on 03/14/2018 at 1:30 p.m.,   We will give her blood

thinner today and get her medically cleared for surgery.



FINAL DIAGNOSES:  Displaced right hip fracture, intertrochanteric and

subtrochanteric fracture and plan for open reduction and internal fixation

when medically cleared and hopefully we could do it on 03/14/2018 at 1:30

p.m. in the OR _____.





__________________________________________

Orlando Slade DO





DD:  03/13/2018 17:14:27

DT:  03/13/2018 19:56:04

Job # [id number]

[id number]

## 2018-03-13 NOTE — RAD
PROCEDURE:  Right hip



HISTORY:

fall



COMPARISON:

None



TECHNIQUE:

Standard protocol for this study/examination.



FINDINGS:

Oblique fracture through the proximal femur are including inter 

trochanteric region with avulsion of the lesser trochanter. 

Additional information cannot be gleaned from this limited study.



IMPRESSION:

Acute proximal right femoral fracture.

## 2018-03-13 NOTE — RAD
PROCEDURE:  CHEST RADIOGRAPH, 1 VIEW



HISTORY:

SOB



COMPARISON:

Portable chest 12/10/2017.



FINDINGS:



LUNGS:

No acute infiltrate bilaterally.



PLEURA:

No pneumothorax or pleural fluid seen.



CARDIOVASCULAR:

Normal.



OSSEOUS STRUCTURES:

No significant abnormalities.



VISUALIZED UPPER ABDOMEN:

Normal.



OTHER FINDINGS:

None. 



IMPRESSION:

No interval acute cardiopulmonary disease appreciated.

## 2018-03-13 NOTE — ED PDOC
Addendum entered and electronically signed by Abadier,Michael, DO  03/13/18 16:

59: 








Physical Exam





Vital Signs











  Temp Pulse Resp BP Pulse Ox


 


 03/13/18 16:31    18   99


 


 03/13/18 15:38   74  18  108/66  95


 


 03/13/18 14:10   72  17  109/69  95


 


 03/13/18 12:43   102 H  19   95


 


 03/13/18 12:30   91 H  18  125/71  93 L


 


 03/13/18 12:07  98.4 F  98 H  18  110/67  98














- Systems Exam


Lower Extremity: Present: Other (Right leg is externally rotated. Knee flexed 

at approx 120 degrees. R. Hip ecchymosis. No active bleeding or evidence of 

open fracture or dislcocation. Skin surrounding hip is warm and dry. Pain with 

attempted range of motion of the R. Hip. Left leg straight. )





Original Note:








Arrival/HPI





- General


Historian: Patient





- History of Present Illness


Time/Duration: Prior to Arrival


Symptom Onset: Gradual


Symptom Course: Worsening


Context: Walking





- General


Chief Complaint: Trauma


Time Seen by Provider: 03/13/18 11:57





- History of Present Illness


Narrative History of Present Illness (Text): 





03/13/18 12:08


Patient is a 77F with a past medical history including heart attack w/stent and 

stroke with no residual weakness who comes to the ED after a fall yesterday 

evening. Patient was reaching for the mail in her apartment after she fell. She 

then crawled to her living room (approx 10 feet) trying to get to the phone so 

she could call her neighbor. The neighbors heard her calling so they went 

downstairs. At this time the patient was put into bed by her neighbors and fell 

asleep for a short while. She then woke up in pain and called her neighbors 

insisting on going to the hospital. 911 was called and the patient was brought 

in. The patient usually ambulated with her walker and was reportedly using her 

walker at the time of the injury. Patient had a colon resection for 

diverticulitis with colostomy reversal. Patient is an everday smoker and 

drinker. 





 (Abadier,Michael)





Past Medical History





- Travel History


Have you recently traveled outside [place] w/in the past 3 mons?: No





- Past History


Past History: No Previous





- Infectious Disease


Hx of Infectious Diseases: None





- Past Medical History


Past Medical History: Non-Contributing





- Cardiac


Hx Cardiac Disorders: Yes (mi)


Hx Congestive Heart Failure: Yes


Other/Comment: angioplasty and stent placement for aortic occlusion 5/28/16





- Pulmonary


Hx Respiratory Disorders: No





- Neurological


Hx Neurological Disorder: Yes


HX Cerebrovascular Accident: Yes





- HEENT


Hx HEENT Disorder: Yes (eyeglasses)





- Renal


Hx Renal Disorder: No





- Endocrine/Metabolic


Hx Endocrine Disorders: No





- Hematological/Oncological


Hx Blood Disorders: Yes (blood transfusion 5/2016)


Hx Anemia: Yes (IRON DEFICIENCY ANEMIA)





- Integumentary


Hx Dermatological Disorder: Yes


Other/Comment: b/l arms thin skin with multiple redish purple skin 

discolorations, slight redness posterior r lower buttock and thigh, l lower abd 

1cm x 0.5cm red wound which developed post colostomy reversal sx small amt dng 

noted,left hip healed red wound, stage 2 1cm round sacral wound clean and dry 

covered with optifoam,r arm skin tear, left 2nd toe red swollen red dark brown 

1cm round dry scab, rash to top of left foot, rash to top right foot, dry brown 

scab 0.5cm round to left knee, with 3 areas of redness to lle, redness to right 

knee with multiple areas of red skin, abd scar.





- Musculoskeletal/Rheumatological


Hx Falls: No


Hx Unsteady Gait: Yes





- Gastrointestinal


Hx Gastrointestinal Disorders: Yes (hc c dif 12/28/16)


Hx Colostomy: Yes (reversal 11/10/16)


Hx Diverticulitis: Yes


Other/Comment: hx GI bleed ischemic bowel, ischemic colitis.  NURSING HOME 

CHART STATES SHE HAS ABSCESS ON ABD. WALL





- Genitourinary/Gynecological


Hx Genitourinary Disorders: Yes


Hx Urinary Tract Infection: Yes





- Psychiatric


Hx Psychophysiologic Disorder: No


Hx Emotional Abuse: No


Hx Physical Abuse: No


Hx Substance Use: No





- Surgical History


Hx Appendectomy: Yes


Hx Hysterectomy: Yes


Other/Comment: colostomy reversal 11/10/16, r hemicolectomy 5/20/16 with 

colostomy





- Anesthesia


Hx Anesthesia: No


Hx Anesthesia Reactions: No


Hx Malignant Hyperthermia: No





- Suicidal Assessment


Feels Threatened In Home Enviroment: No





Family/Social History


Family/Social History: No Known Family HX


Smoking Status: Never Smoked


Hx Alcohol Use: No


Hx Substance Use: No





Allergies/Home Meds


Allergies/Adverse Reactions: 


Allergies





No Known Allergies Allergy (Verified 03/13/18 15:49)


 








Home Medications: 


 Home Meds











 Medication  Instructions  Recorded  Confirmed


 


Hydrocortisone 2.5% 2.5 % TOP QID 03/13/18 03/13/18





[Hydrocortisone 2.5%]   


 


Triamterene/Hydrochlorothiazid 50 - 75 cap PO DAILY 03/13/18 03/13/18





[Triamterene-Hydrochlorothiazide   





25 mg-37.5 mg]   














Review of Systems





- Review of Systems


Constitutional: Normal


Eyes: Normal


ENT: Normal


Respiratory: Normal


Cardiovascular: Normal


Gastrointestinal: Normal


Genitourinary Female: Normal


Musculoskeletal: Other (R. Hip pain)


Skin: Other (ecchymosis b/l upper extrem)


Neurological: Normal


Endocrine: Normal


Hemo/Lymphatic: Normal


Psychiatric: Normal





Physical Exam





- Physical Exam


Physical Exam Limitations: Uncooperative


Temperature: Afebrile


Blood Pressure: Normal


Pulse: Regular


Respiratory Rate: Normal


Appearance: Positive for: Non-Toxic


Pain Distress: Severe


Mental Status: Positive for: Alert and Oriented X 3





- Systems Exam


Head: Present: Atraumatic, Normocephalic


Pupils: Present: PERRL


Extroacular Muscles: Present: EOMI


Conjunctiva: Present: Normal


Mouth: Present: Moist Mucous Membranes


Neck: Present: Normal Range of Motion


Respiratory/Chest: Present: Clear to Auscultation, Good Air Exchange.  No: 

Respiratory Distress, Accessory Muscle Use


Cardiovascular: Present: Regular Rate and Rhythm, Normal S1, S2.  No: Murmurs


Abdomen: Present: Normal Bowel Sounds.  No: Tenderness, Distention, Peritoneal 

Signs


Back: Present: Normal Inspection


Upper Extremity: Present: Other (ecchymosis)


Lower Extremity: Present: Other (ecchymosis).  No: Normal ROM


Neurological: Present: GCS=15, CN II-XII Intact, Speech Normal


Skin: Present: Warm, Dry, Normal Color.  No: Rashes


Psychiatric: Present: Alert, Oriented x 3


Vital Signs











  Temp Pulse Resp BP Pulse Ox


 


 03/13/18 16:31    18   99


 


 03/13/18 15:38   74  18  108/66  95


 


 03/13/18 14:10   72  17  109/69  95


 


 03/13/18 12:43   102 H  19   95


 


 03/13/18 12:30   91 H  18  125/71  93 L


 


 03/13/18 12:07  98.4 F  98 H  18  110/67  98














Medical Decision Making


ED Course and Treatment: 





03/13/18 12:56


Patient seen and evaluated with medical resident. History supplemented with her 

"tenant" who is at her bedside. Reportedly patient fell YESTERDAY,  

reportedly helped her to bed and she was not able to bear weight. Currently she 

denies chest pain or shortness of breath, denies abdominal pain or vomiting or 

diarrhea.  Patient noted to be in severe pain with deformity and shortening/

rotation of the right leg. NV intact.


At this time no focal weakness noted. IV pain medication ordered.





Xrays pending at this time.


03/13/18 21:17


Xrays reveal right hip fracture. Pain improved but persistent after iv 

morphine. She is nv intact.


EKG reveals lateral st changes, although she denies any chest pain or shortness 

of breath. Initial troponin and cpk unrermarkable.


There is skin tear to right hand, but no bony pain.  A dressing was applied to 

this after it was cleansed and irrigated. Skin tear too superficial to suture.


Case d/w Dr. BELINDA Edwards, will consult ortho, Dr. Valladares is covering for oncall 

orthopedic physician.





Will admit to remote telemetry bed as she has multiple comorbities, although on 

current exam NO abdominal pain, NO chest pain, NO sob. NO dark or bloody stool. 

(Julio Cesar Mora)





- Lab Interpretations


Lab Results: 








 03/13/18 12:28 





 03/13/18 12:28 





 Lab Results





03/13/18 12:28: Sodium 143, Potassium 3.7, Chloride 109 H, Carbon Dioxide 23, 

Anion Gap 14, BUN 12, Creatinine 0.5 L, Est GFR ( Amer) > 60, Est GFR (

Non-Af Amer) > 60, Random Glucose 201 H, Calcium 8.9, Phosphorus 3.2, Magnesium 

1.8, Total Bilirubin 0.6, AST 99 H, ALT 44, Alkaline Phosphatase 498 H, Lactate 

Dehydrogenase 824 H, Total Creatine Kinase 201, Troponin I < 0.01, Total 

Protein 6.3, Albumin 3.1, Globulin 3.2, Albumin/Globulin Ratio 1.0 L


03/13/18 12:28: PT 10.4, INR 0.91 L, APTT 31.0


03/13/18 12:28: WBC 9.2  D, RBC 2.55 L, Hgb 9.6 L, Hct 29.6 L, .1 H, MCH 

37.6 H, MCHC 32.4, RDW 17.4 H, Plt Count 345, MPV 10.3, Gran % 78.7 H, Lymph % (

Auto) 8.0 L, Mono % (Auto) 12.8 H, Eos % (Auto) 0.0 L, Baso % (Auto) 0.5, Gran 

# 7.26 H, Lymph # (Auto) 0.7 L, Mono # (Auto) 1.2 H, Eos # (Auto) 0.0, Baso # (

Auto) 0.05











- RAD Interpretation


Radiology Orders: 








03/13/18 12:23


CHEST ONE VIEW [RAD] Stat 





03/13/18 12:30


HIP MIN 2V W/ PELVIS RT [RAD] Stat 














- Medication Orders


Current Medication Orders: 








Acetaminophen (Tylenol 325mg Tab)  650 mg PO Q6H PRN


   PRN Reason: Fever >100.4 F


Atorvastatin Calcium (Lipitor)  80 mg PO DIN [facility]


Sodium Chloride (Sodium Chloride 0.9%)  500 mls @ 999 mls/hr IV .Q31M [facility]


   Last Admin: 03/13/18 12:35  Dose: 999 mls/hr





eMAR Start Stop


 Document     03/13/18 12:35  [email or web address]  (Rec: 03/13/18 12:35  [email or web address]  [id number]

[id number])


     Intravenous Solution


      Start Date                                 03/13/18


      Start Time                                 12:35


      End Date                                   03/13/18





Sodium Chloride (Sodium Chloride 0.9%)  1,000 mls @ 100 mls/hr IV .Q10H [facility]


   Last Admin: 03/13/18 18:12  Dose: 100 mls/hr





eMAR Start Stop


 Document     03/13/18 18:12  [facility]  (Rec: 03/13/18 18:12  [facility]  [id number])


     Intravenous Solution


      Start Date                                 03/13/18


      Start Time                                 18:12





Metoprolol Tartrate (Lopressor)  12.5 mg PO BID [facility]


   Last Admin: 03/13/18 18:41  Dose: 12.5 mg





MAR Pulse and Blood Pressure


 Document     03/13/18 18:41  [facility]  (Rec: 03/13/18 18:41  [facility]  [id number])


     Pulse


      Pulse Rate (60-90 beats/min)               86


     Blood Pressure


      Blood Pressure (100//90 mm Hg)       125/67





Morphine Sulfate (Morphine)  4 mg IVP Q4H PRN


   PRN Reason: Pain, severe (8-10)


   Last Admin: 03/13/18 17:34  Dose: 4 mg





MAR Pain Assessment


 Document     03/13/18 17:34  [facility]  (Rec: 03/13/18 17:34  [id number]


     Pain Reassessment


      Is this a pain reassessment?               No


     Presence of Pain


      Presence of Pain                           Yes


     Pain Scale Used


      Pain Scale Used                            Numeric


     Location


      Left, Right or Bilateral                   Right


      Pain Location Body Site                    Hip


     Description


      Description                                Constant


      Intensity of Pain at present               10


      Pain Behavior                              Moaning


                                                 Guarding


                                                 Withdrawal from Touch


      Aggravating Factors                        Changing Position


      Alleviating Factors/Management             Medication


       Techniques                                


      Alleviating Factors                        Medication


IVP Administration


 Document     03/13/18 17:34  [facility]  (Rec: 03/13/18 17:34  [id number]


     Charges for Administration


      # of IVP Administrations                   1


Re-Assess: MAR Pain Assessment


 Document     03/13/18 18:34  [facility]  (Rec: 03/13/18 18:34  [email or web address]


     Pain Reassessment


      Is this a pain reassessment?               Yes


     Sleep


      Is patient sleeping during reassessment?   Yes





Tramadol HCl (Ultram)  50 mg PO Q8 PRN


   PRN Reason: Pain, moderate (4-7)





Discontinued Medications





Morphine Sulfate (Morphine)  2 mg IM STAT STA


   Stop: 03/13/18 12:05


   Last Admin: 03/13/18 12:08  Dose: 2 mg





MAR Pain Assessment


 Document     03/13/18 12:08  [email or web address]  (Rec: 03/13/18 12:09  CASTS1  [id number]

[id number])


     Pain Reassessment


      Is this a pain reassessment?               No


     Sleep


      Is patient sleeping during reassessment?   No


     Presence of Pain


      Presence of Pain                           Yes


     Pain Scale Used


      Pain Scale Used                            Numeric


     Location


      Left, Right or Bilateral                   Right


      Pain Location Body Site                    Arm


                                                 Leg


     Description


      Description                                Constant


      Intensity of Pain at present               9


      Pain Behavior                              Moaning


                                                 Crying


                                                 Guarding


                                                 Irritability


                                                 Withdrawal from Touch


                                                 Grasping Site


                                                 Restlessness


                                                 Facial Grimacing


                                                 Screaming


      Aggravating Factors                        Changing Position


      Alleviating Factors/Management             Medication


       Techniques                                


      Alleviating Factors                        Medication


IM Administration Charges


 Document     03/13/18 12:08  CASTS1  (Rec: 03/13/18 12:09  [address]-

[id number])


     Injection Site


      MAR Injection Site                         Right Deltoid


     Charges for Administration


      # of IM Administrations                    1





Morphine Sulfate (Morphine)  2 mg IVP STAT STA


   Stop: 03/13/18 12:27


   Last Admin: 03/13/18 12:35  Dose: 2 mg





MAR Pain Assessment


 Document     03/13/18 12:35  CASTS1  (Rec: 03/13/18 12:36  [address]-

[id number])


     Pain Reassessment


      Is this a pain reassessment?               No


     Sleep


      Is patient sleeping during reassessment?   No


     Presence of Pain


      Presence of Pain                           Yes


     Pain Scale Used


      Pain Scale Used                            Numeric


     Location


      Left, Right or Bilateral                   Right


      Pain Location Body Site                    Leg


     Description


      Intensity of Pain at present               9


      Pain Behavior                              Facial Grimacing


      Alleviating Factors/Management             Medication


       Techniques                                


      Alleviating Factors                        Medication


IVP Administration


 Document     03/13/18 12:35  CASTS1  (Rec: 03/13/18 12:36  [address]-

[id number])


     Charges for Administration


      # of IVP Administrations                   1





Morphine Sulfate (Morphine)  2 mg IVP STAT STA


   Stop: 03/13/18 12:45


   Last Admin: 03/13/18 12:48  Dose: 2 mg





MAR Pain Assessment


 Document     03/13/18 12:48  CASTS1  (Rec: 03/13/18 12:48  [address]-

[id number])


     Pain Reassessment


      Is this a pain reassessment?               No


     Sleep


      Is patient sleeping during reassessment?   No


     Presence of Pain


      Presence of Pain                           Yes


     Pain Scale Used


      Pain Scale Used                            Numeric


     Location


      Left, Right or Bilateral                   Right


      Pain Location Body Site                    Leg


     Description


      Description                                Constant


      Intensity of Pain at present               9


      Pain Behavior                              Facial Grimacing


      Aggravating Factors                        Changing Position


      Alleviating Factors/Management             Medication


       Techniques                                


      Alleviating Factors                        Medication


IVP Administration


 Document     03/13/18 12:48  CASTS1  (Rec: 03/13/18 12:48  [place] [id number]-

[id number])


     Charges for Administration


      # of IVP Administrations                   1





Pneumococcal Polyvalent Vaccine (Pneumovax 23 Vaccine)  0.5 ml IM .ONCE ONE


   Stop: 03/13/18 20:03


Tetanus/Reduced Diphtheria/Acell Pertussis (Boostrix Vaccine Inj)  0.5 ml IM 

.ONCE ONE


   Stop: 03/13/18 15:04


   Last Admin: 03/13/18 16:32  Dose:  





MAR Immunization Data


 Document     03/13/18 16:32  [facility]  (Rec: 03/13/18 16:33  [facility]  [id number]

[id number])


     Immunization Data


      Associated Event Comment                   Patient in pain does not want


                                                 shot at this time.


Immunization Registry


 Document     03/13/18 16:32  CASTS1  (Rec: 03/13/18 16:33  [facility]  [id number]

[id number])


      Immunization Registry Consent Date         12/10/17














- PA / NP / Resident Statement


MD/DO has reviewed & agrees with the documentation as recorded.


MD/DO has examined the patient and agrees with the treatment plan.





Disposition/Present on Arrival





- Present on Arrival


Any Indicators Present on Arrival: No


History of DVT/PE: No


History of Uncontrolled Diabetes: No


Urinary Catheter: No


History Surgical Site Infection Following: None





- Disposition


Have Diagnosis and Disposition been Completed?: Yes


Disposition Time: 14:24


Patient Plan: Admission





- Disposition


Diagnosis: 


 Hip fracture, Skin tear, Arm contusion, Abnormal EKG





Disposition: HOSPITALIZED


Patient Problems: 


 Current Active Problems











Problem Status Onset


 


Abnormal EKG Acute  


 


Arm contusion Acute  


 


Hip fracture Acute  


 


Skin tear Acute  











Condition: SERIOUS

## 2018-03-14 LAB
ALBUMIN SERPL-MCNC: 2.6 G/DL (ref 3–4.8)
ALBUMIN/GLOB SERPL: 0.9 {RATIO} (ref 1.1–1.8)
ALT SERPL-CCNC: 42 U/L (ref 7–56)
APPEARANCE UR: (no result)
ARTERIAL BLOOD GAS HEMOGLOBIN: 11.3 G/DL (ref 11.7–17.4)
ARTERIAL BLOOD GAS O2 SAT: 91.6 % (ref 95–98)
ARTERIAL BLOOD GAS PCO2: 32 MM/HG (ref 35–45)
ARTERIAL BLOOD GAS TCO2: 17.9 MMOL.L (ref 22–28)
AST SERPL-CCNC: 67 U/L (ref 14–36)
BACTERIA #/AREA URNS HPF: (no result) /[HPF]
BASOPHILS # BLD AUTO: 0.04 K/MM3 (ref 0–2)
BASOPHILS # BLD AUTO: 0.04 K/MM3 (ref 0–2)
BASOPHILS NFR BLD: 0.3 % (ref 0–3)
BASOPHILS NFR BLD: 0.4 % (ref 0–3)
BILIRUB UR-MCNC: NEGATIVE MG/DL
BUN SERPL-MCNC: 12 MG/DL (ref 7–21)
BUN SERPL-MCNC: 16 MG/DL (ref 7–21)
CALCIUM SERPL-MCNC: 8 MG/DL (ref 8.4–10.5)
CALCIUM SERPL-MCNC: 8.2 MG/DL (ref 8.4–10.5)
COLOR UR: YELLOW
EOSINOPHIL # BLD: 0 10*3/UL (ref 0–0.7)
EOSINOPHIL # BLD: 0.1 10*3/UL (ref 0–0.7)
EOSINOPHIL NFR BLD: 0.1 % (ref 1.5–5)
EOSINOPHIL NFR BLD: 0.5 % (ref 1.5–5)
EPI CELLS #/AREA URNS HPF: (no result) /HPF (ref 0–5)
ERYTHROCYTE [DISTWIDTH] IN BLOOD BY AUTOMATED COUNT: 17.8 % (ref 11.5–14.5)
ERYTHROCYTE [DISTWIDTH] IN BLOOD BY AUTOMATED COUNT: 23.6 % (ref 11.5–14.5)
GFR NON-AFRICAN AMERICAN: > 60
GFR NON-AFRICAN AMERICAN: > 60
GLUCOSE UR STRIP-MCNC: NEGATIVE MG/DL
GRANULOCYTES # BLD: 8.04 10*3/UL (ref 1.4–6.5)
GRANULOCYTES # BLD: 9.55 10*3/UL (ref 1.4–6.5)
GRANULOCYTES NFR BLD: 76.1 % (ref 50–68)
GRANULOCYTES NFR BLD: 80 % (ref 50–68)
HCO3 BLDA-SCNC: 16.9 MMOL/L (ref 21–28)
HGB BLD-MCNC: 11.7 G/DL (ref 12–16)
HGB BLD-MCNC: 7.7 G/DL (ref 12–16)
INHALED O2 CONCENTRATION: 100 %
INR PPP: 0.97 (ref 0.93–1.08)
LEUKOCYTE ESTERASE UR-ACNC: NEGATIVE LEU/UL
LYMPHOCYTES # BLD: 0.8 10*3/UL (ref 1.2–3.4)
LYMPHOCYTES # BLD: 1 10*3/UL (ref 1.2–3.4)
LYMPHOCYTES NFR BLD AUTO: 7.8 % (ref 22–35)
LYMPHOCYTES NFR BLD AUTO: 8.3 % (ref 22–35)
MCH RBC QN AUTO: 33.6 PG (ref 25–35)
MCH RBC QN AUTO: 37.2 PG (ref 25–35)
MCHC RBC AUTO-ENTMCNC: 31.7 G/DL (ref 31–37)
MCHC RBC AUTO-ENTMCNC: 32.6 G/DL (ref 31–37)
MCV RBC AUTO: 103.2 FL (ref 80–105)
MCV RBC AUTO: 117.4 FL (ref 80–105)
MONOCYTES # BLD AUTO: 1.4 10*3/UL (ref 0.1–0.6)
MONOCYTES # BLD AUTO: 1.6 10*3/UL (ref 0.1–0.6)
MONOCYTES NFR BLD: 11.3 % (ref 1–6)
MONOCYTES NFR BLD: 15.2 % (ref 1–6)
O2 CAP BLDA-SCNC: 15.5 ML/DL (ref 16–24)
O2 CT BLDA-SCNC: 14.2 ML/DL (ref 15–23)
PH BLDA: 7.33 [PH] (ref 7.35–7.45)
PH UR STRIP: 5 [PH] (ref 4.7–8)
PLATELET # BLD: 227 10^3/UL (ref 120–450)
PLATELET # BLD: 299 10^3/UL (ref 120–450)
PMV BLD AUTO: 10.4 FL (ref 7–11)
PMV BLD AUTO: 10.8 FL (ref 7–11)
PO2 BLDA: 56 MM/HG (ref 80–100)
PROT UR STRIP-MCNC: NEGATIVE MG/DL
PROTHROMBIN TIME: 11.2 SECONDS (ref 9.4–12.5)
RBC # BLD AUTO: 2.07 10^6/UL (ref 3.5–6.1)
RBC # BLD AUTO: 3.48 10^6/UL (ref 3.5–6.1)
RBC # UR STRIP: (no result) /UL
SP GR UR STRIP: >= 1.03 (ref 1–1.03)
UROBILINOGEN UR STRIP-ACNC: 0.2 E.U./DL
WBC # BLD AUTO: 10.6 10^3/UL (ref 4.5–11)
WBC # BLD AUTO: 11.9 10^3/UL (ref 4.5–11)
WBC #/AREA URNS HPF: (no result) /HPF (ref 0–6)

## 2018-03-14 PROCEDURE — 0QS606Z REPOSITION RIGHT UPPER FEMUR WITH INTRAMEDULLARY INTERNAL FIXATION DEVICE, OPEN APPROACH: ICD-10-PCS | Performed by: ORTHOPAEDIC SURGERY

## 2018-03-14 PROCEDURE — 30233N1 TRANSFUSION OF NONAUTOLOGOUS RED BLOOD CELLS INTO PERIPHERAL VEIN, PERCUTANEOUS APPROACH: ICD-10-PCS | Performed by: STUDENT IN AN ORGANIZED HEALTH CARE EDUCATION/TRAINING PROGRAM

## 2018-03-14 RX ADMIN — MORPHINE SULFATE PRN MG: 4 INJECTION, SOLUTION INTRAMUSCULAR; INTRAVENOUS at 22:44

## 2018-03-14 RX ADMIN — MORPHINE SULFATE PRN MG: 4 INJECTION, SOLUTION INTRAMUSCULAR; INTRAVENOUS at 08:06

## 2018-03-14 RX ADMIN — MORPHINE SULFATE PRN MG: 4 INJECTION, SOLUTION INTRAMUSCULAR; INTRAVENOUS at 02:54

## 2018-03-14 NOTE — PN
DATE:  03/14/2018



SUBJECTIVE:  The patient's surgery on the right hip has to be delayed for

peritrochanteric right because when she came in on the 13th, she was

dehydrated because she fell  the night before.  Her admitting hemoglobin

was 9.6, hematocrit 29.6, then the blood count came down this morning to

7.7 hemoglobin, 24 hematocrit, so we are going to give 2 units of blood,

which would delay the surgery and then we will send her to Surgery with

having 2 more units on hand as this fracture is the cause for her anemia,

but also would help aggravate the anemia.  So at dilution that we had, I

gave her normal saline, IV fluids because of dehydration she was in.  When

she came in, she was dehydrated mildly.  So, we gave her 2 units of packed

cells today, do the surgery this afternoon and get her up out of bed

tomorrow.  By fixing the fracture, she should bleed much less.  The

fracture being intertrochanteric fracture with a subtrochanteric component

does bleed quite a bit.  So, as we stabilize the fracture, she should bleed

less and have less pain.  So, the cardiac clearance was given.  She should

benefit from the surgery and there is always a risk, but it is not that

high of the risk.  By not doing the surgery, she has increased risk of

pneumonia, bed sores, and further bleeding.







__________________________________________

Orlando Slade DO



DD:  03/14/2018 13:34:36

DT:  03/14/2018 14:02:30

Job # [id number]

## 2018-03-14 NOTE — CP.PCM.PCO
Assessment & Plan


(1) Pre-operative cardiovascular examination, high risk surgery


Status: Acute   





(2) Hip fracture


Status: Acute   





- Assessment and Plan (Free Text)


Assessment: 





Patient seen and examined this AM. Requires hip repair surgery. Has extensive 

atherosclerotic disease with prior MI, PAD with occluded abdominal aorta, as 

well as moderate mitral regurgitation. She has a pre-operative anemia with a 

hemoglobin of 7.7. Her cardiac risk is moderately high but given the urgent 

need for surgery, it would appear reasonable to proceed, but I would recommend 

transfusing her to a hemoglobin if greater than 9 prior to surgery today.


Will follow.











- Date & Time


Date: 03/14/18


Time: 10:45

## 2018-03-14 NOTE — HP
HISTORY OF PRESENT ILLNESS:  

The patient is a 77 year old woman with a past medical history of severe PVD, 
mesenteric ischemia s/p SMA stent with history of ischemia colitis s/p right 
hemicolectomy s/p ileostomy with reversal, CAD s/p NSTEMI and history of TIA 
who presented to [facility] ED for evaluation of acute onset of 
right hip pain after a mechanical fall.  The patient reports being in her usual 
state of health until the day of presentation to the ED when she tripped and 
fell on her right side.  She noted immediate onset of severe right hip pain and 
inability to ambulate.  She crawled approximately 10 feet to the phone and 
called EMS.  She was subsequently brought to [facility] ED for 
evaluation where workup demonstrated an acute proximal right femoral fracture. 
  She was evaluated by Dr. Slade of Orthopedic Surgery but owing to her 
multiple comorbidities cardiac clearance was requested.  The patient was then 
admitted to the telemetry dee for pain control and evaluation by Dr. Christianson of 
Cardiology.



PAST MEDICAL HISTORY:  

As per HPI, also anemia of chronic disease.



PAST SURGICAL HISTORY:  

As per HPI.



ALLERGIES:  

NKDA.



MEDICATIONS:  

Aspirin 81 mg p.o. daily and Lipitor 80 mg p.o. daily.



FAMILY HISTORY:  

Noncontributory.



SOCIAL HISTORY:  

The patient denies any toxic habits.



REVIEW OF SYSTEMS:  

The patient denies chest pain, palpitations, claudication, orthopnea, lower 
extremity edema, presyncope, or syncope.  The remainder of review of systems is 
negative except as per HPI.



PHYSICAL EXAMINATION:

VITAL SIGNS:  Temperature 98.9, pulse 96, blood pressure 117/60, respiratory 
rate 18, and oxygen saturation 98% on room air.

GENERAL:  Frail elderly, chronically ill appearing woman lying in bed, in no 
apparent distress.

HEENT:  Normocephalic, atraumatic.  PERRL.  EOMI.  No scleral icterus.  Mild 
conjunctival pallor is noted.

NECK:  No JVD.

LUNGS:  Clear to auscultation.

CARDIOVASCULAR:  Regular rate and rhythm.  Normal S1 and S2.

ABDOMEN:  Normoactive bowel sounds.  Soft, nontender, and nondistended.

EXTREMITIES:  No edema.  Decreased range of motion to right hip (limited by pain
).

NEUROLOGIC:  Awake, alert, and oriented x3.  No focal motor deficits.



LABORATORY DATA:  

WBC 10.6 with 76% neutrophils, hemoglobin 7.7, hematocrit 24, and platelets 
299.  

Sodium 143, potassium 3.6, chloride 113, bicarbonate 25, BUN 12, creatinine 0.5
, and glucose 111.



IMAGING STUDIES:

1.  Chest x-ray demonstrated no acute pathology.

2.  X-ray of the right hip demonstrated acute proximal right femoral fracture.



ASSESSMENT:  

The patient is a 77 year old woman with a past medical history of CAD s/p NSTEMI
, severe PVD, history of TIA and history of mesenteric ischemia s/p right 
hemicolectomy s/p ileostomy with reversal who presented s/p mechanical fall 
with a sustained right femoral fracture and who was admitted for orthopedic 
repair.



PLAN:

1.  Acute closed fracture of the right femur.  Input from Dr. Slade 
noted and greatly appreciated.  The patient is pending cardiac evaluation with 
Dr. Christianson.  She would certainly be a high-risk patient for an intermediate 
risk procedure given her extensive underlying vasculopathy.  The patient has 
been started on beta-blockade with Mmetoprolol 12.5 mg p.o. b.i.d.  Will 
transfuse to goal Hb > 9.

2.  CAD s/p NSTEMI.  Resume Lipitor 80 mg p.o. daily.  We will hold Aspirin 81 
mg p.o. daily in anticipation of OR.

3.  History of TIA.  As above, we will resume Lipitor 80 mg p.o. daily and hold 
Aspirin 81 mg p.o. daily in anticipation of OR.

4.  Severe PVD. Resume Lipitor 80 mg p.o. daily.

5.  Anemia of chronic disease.  As above we will transfuse PRBC's to goal Hb > 
9.  Continue to monitor H/H daily.

6.  Prophylaxis.  Continue Protonix 40 mg IV daily for GI prophylaxis.  We will 
hold Lovenox in anticipation of OR.





CODE STATUS:  Full code.





__________________________________________

Jose Elias Edwards MD



DD:  03/14/2018 8:49:00

DT:  03/14/2018 12:52:51

Job # [id number]

CEDRIC

## 2018-03-14 NOTE — CARD
--------------- APPROVED REPORT --------------





EKG Measurement

Heart [id number]

DE 124P47

[id number]

[id number]

[id number]



<Conclusion>

Sinus tachycardia

STTW changes c/w ischemia

LVH by voltage

Prolonged QTc

## 2018-03-14 NOTE — CP.PCM.CON
History of Present Illness





- History of Present Illness


History of Present Illness: 





Ben Graves PGY1 ICU Consult Note 





Ms. Coleman is a 76 yo Caucian Female with PMH of severe PVD, mesenteric 

ischemia s/p SMA stent w/ history of ischemic colitis s/p R hemicoelctomy s/p 

ileostomy w/ reversal, CAD s/p NSTEMI and TIA who presented with acute R hip 

pain s/p mechanicall fall. The patient was found to have an acute oblique 

fracture through proximal femur (inter-trochancteric region w/ avulsion of 

lesser trochanter). Patient was evaluated by Ortho and cleared by Cardio for OR 

today for an ORIF of femural w/ nail (antegrade). 





Post-op, while in PACU, the patient was noted to by hypoxic with inconsistent 

pulse ox readings. She was placed on non-rebreather and ABG was done and showed 

metabolic acidosis and hypoxia. Pulse ox determined to be be >90%. ICU Consult 

was placed. 





When seen, the patient is awake and alert but no oriented and not responding 

appropriately. she is unable to follow simple commands. ROS was limited. 





Per Chart Review: 





PMH: as above 


PSH: R hip ORIF, JR/BSO, R hemicolectomy with illeostomy reversal


ALL: NKDA


SHx: drinks ETOH occasionally, denies any smoking or drugs 


FHx: Non contributory





Review of Systems





- Review of Systems


Systems not reviewed;Unavailable: Altered Mental Status





Past Patient History





- Infectious Disease


Hx of Infectious Diseases: None





- Past Medical History & Family History


Past Medical History?: Yes





- Past Social History


Smoking Status: Current Some Days Smoker


Alcohol: Occasional


Drugs: Denies





- CARDIAC


Hx Cardiac Disorders: Yes (mi)


Hx Congestive Heart Failure: Yes


Other/Comment: angioplasty and stent placement for aortic occlusion 5/28/16





- PULMONARY


Hx Respiratory Disorders: Yes (CURRENTLY SMOKES CIGARETTES .STATES SHE SMOKES 

EVERY NOW AND THEN.)





- NEUROLOGICAL


Hx Neurological Disorder: Yes


HX Cerebrovascular Accident: Yes





- HEENT


Hx HEENT Problems: Yes (eyeglasses)





- RENAL


Hx Chronic Kidney Disease: No





- ENDOCRINE/METABOLIC


Hx Endocrine Disorders: No





- HEMATOLOGICAL/ONCOLOGICAL


Hx Blood Transfusions: Yes


Hx Blood Transfusion Reaction: No





- INTEGUMENTARY


Hx Dermatological Problems: Yes


Other/Comment: diffuse thin skin with multiple redish purple skin discolorations

,





- MUSCULOSKELETAL/RHEUMATOLOGICAL


Hx Musculoskeletal Disorders: Yes


Hx Falls: Yes (3-13-18)


Hx Fractures: Yes (R HIP FRACTURE 3-12-18)


Hx Unsteady Gait: Yes (WALKER)





- GASTROINTESTINAL


Hx Gastrointestinal Disorders: Yes (hc c dif 12/28/16)


Hx Colostomy: Yes (reversal 11/10/16)


Hx Diverticulitis: Yes


Other/Comment: hx GI bleed ischemic bowel, ischemic colitis.  NURSING HOME 

CHART STATES SHE HAS ABSCESS ON ABD. WALL





- GENITOURINARY/GYNECOLOGICAL


Hx Genitourinary Disorders: Yes


Hx Urinary Tract Infection: Yes





- PSYCHIATRIC


Hx Psychophysiologic Disorder: Yes (SMOKES AND DRINKS BEER EVERY NOW AND THEN)


Hx Emotional Abuse: No


Hx Physical Abuse: No


Hx Substance Use: No





- SURGICAL HISTORY


Hx Surgeries: Yes





- ANESTHESIA


Hx Anesthesia Reactions: No


Hx Malignant Hyperthermia: No





Meds


Allergies/Adverse Reactions: 


 Allergies











Allergy/AdvReac Type Severity Reaction Status Date / Time


 


No Known Allergies Allergy   Verified 03/13/18 15:49














- Medications


Medications: 


 Current Medications





Acetaminophen (Tylenol 325mg Tab)  650 mg PO Q6H PRN


   PRN Reason: Fever >100.4 F


Acetaminophen (Tylenol 650 Mg Supp)  650 mg RC Q4H PRN


   PRN Reason: Fever >100.4 F


   Last Admin: 03/14/18 18:15 Dose:  650 mg


Acetaminophen (Tylenol 325mg Tab)  650 mg PO Q6H PRN


   PRN Reason: Pain, moderate (4-7)


Atorvastatin Calcium (Lipitor)  80 mg PO DIN [facility]


   Last Admin: 03/14/18 17:26 Dose:  Not Given


Cefazolin Sodium (Ancef 1gm In Ns)  1 gm in 100 mls @ 100 mls/hr IVPB Q8 [facility]


   PRN Reason: Protocol


Metoprolol Tartrate (Lopressor)  12.5 mg PO BID [facility]


   Last Admin: 03/14/18 17:43 Dose:  12.5 mg


Morphine Sulfate (Morphine)  4 mg IVP Q4H PRN


   PRN Reason: Pain, severe (8-10)


   Last Admin: 03/14/18 22:44 Dose:  4 mg


Pantoprazole Sodium (Protonix Inj)  40 mg IVP DAILY [facility]


   Last Admin: 03/14/18 11:50 Dose:  40 mg


Tramadol HCl (Ultram)  50 mg PO Q8 PRN


   PRN Reason: Pain, moderate (4-7)











Physical Exam





- Constitutional


Appears: Well, Non-toxic, No Acute Distress, Confused, Cachectic





- Head Exam


Head Exam: ATRAUMATIC, NORMAL INSPECTION





- Eye Exam


Eye Exam: EOMI, Normal appearance, PERRL





- ENT Exam


ENT Exam: Mucous Membranes Dry





- Respiratory Exam


Respiratory Exam: Rales (L bases), NORMAL BREATHING PATTERN.  absent: Rhonchi, 

Wheezes, Respiratory Distress


Additional comments: 





on non-rebreather








- Cardiovascular Exam


Cardiovascular Exam: RRR, +S1, +S2





- GI/Abdominal Exam


GI & Abdominal Exam: Soft.  absent: Distended, Tenderness





- Extremities Exam


Extremities exam: Negative for: pedal edema


Additional comments: 





R hip with dressing c/d/i








- Back Exam


Back exam: NORMAL INSPECTION





- Neurological Exam


Neurological exam: Altered





- Skin


Skin Exam: Erythema (multiple lesions diffuse)





Results





- Vital Signs


Recent Vital Signs: 


 Last Vital Signs











Temp  98.8 F   03/14/18 21:45


 


Pulse  70   03/14/18 21:45


 


Resp  17   03/14/18 21:45


 


BP  129/67   03/14/18 21:45


 


Pulse Ox  95   03/14/18 21:45














- Labs


Result Diagrams: 


 03/14/18 21:28





 03/14/18 21:28


Labs: 


 Laboratory Results - last 24 hr











  03/14/18 03/14/18 03/14/18





  00:30 05:30 06:00


 


WBC    10.6


 


RBC    2.07 L


 


Hgb    7.7 L


 


Hct    24.3 L


 


MCV    117.4 H


 


MCH    37.2 H


 


MCHC    31.7


 


RDW    17.8 H


 


Plt Count    299


 


MPV    10.4


 


Gran %    76.1 H


 


Lymph % (Auto)    7.8 L


 


Mono % (Auto)    15.2 H


 


Eos % (Auto)    0.5 L


 


Baso % (Auto)    0.4


 


Gran #    8.04 H


 


Lymph # (Auto)    0.8 L


 


Mono # (Auto)    1.6 H


 


Eos # (Auto)    0.1


 


Baso # (Auto)    0.04


 


PT   


 


INR   


 


pCO2   


 


pO2   


 


HCO3   


 


ABG pH   


 


ABG Total CO2   


 


ABG O2 Saturation   


 


ABG O2 Content   


 


ABG Base Excess   


 


ABG Hemoglobin   


 


ABG Carboxyhemoglobin   


 


POC ABG HHb (Measured)   


 


ABG Methemoglobin   


 


ABG O2 Capacity   


 


Hgb O2 Saturation   


 


FiO2   


 


Sodium   


 


Potassium   


 


Chloride   


 


Carbon Dioxide   


 


Anion Gap   


 


BUN   


 


Creatinine   


 


Est GFR ( Amer)   


 


Est GFR (Non-Af Amer)   


 


Random Glucose   


 


Calcium   


 


Total Bilirubin   


 


AST   


 


ALT   


 


Alkaline Phosphatase   


 


Total Protein   


 


Albumin   


 


Globulin   


 


Albumin/Globulin Ratio   


 


Urine Color  Yellow  


 


Urine Appearance  Sl cloudy  


 


Urine pH  5.0  


 


Ur Specific Gravity  >= 1.030  


 


Urine Protein  Negative  


 


Urine Glucose (UA)  Negative  


 


Urine Ketones  Negative  


 


Urine Blood  Small H  


 


Urine Nitrate  Positive H  


 


Urine Bilirubin  Negative  


 


Urine Urobilinogen  0.2  


 


Ur Leukocyte Esterase  Negative  


 


Urine RBC  1 - 3  


 


Urine WBC  0 - 2  


 


Ur Epithelial Cells  0 - 2  


 


Urine Bacteria  Many  


 


Blood Type   O POSITIVE 


 


Antibody Screen   Negative 


 


Crossmatch   See Detail 


 


BBK History Checked   Patient has bt 














  03/14/18 03/14/18 03/14/18





  06:00 06:00 21:20


 


WBC   


 


RBC   


 


Hgb   


 


Hct   


 


MCV   


 


MCH   


 


MCHC   


 


RDW   


 


Plt Count   


 


MPV   


 


Gran %   


 


Lymph % (Auto)   


 


Mono % (Auto)   


 


Eos % (Auto)   


 


Baso % (Auto)   


 


Gran #   


 


Lymph # (Auto)   


 


Mono # (Auto)   


 


Eos # (Auto)   


 


Baso # (Auto)   


 


PT  11.2  


 


INR  0.97  


 


pCO2    32 L


 


pO2    56.0 L


 


HCO3    16.9 L


 


ABG pH    7.33 L


 


ABG Total CO2    17.9 L


 


ABG O2 Saturation    91.6 L


 


ABG O2 Content    14.2 L


 


ABG Base Excess    -8.1 L


 


ABG Hemoglobin    11.3 L


 


ABG Carboxyhemoglobin    1.9 H


 


POC ABG HHb (Measured)    8.2 H


 


ABG Methemoglobin    0.8


 


ABG O2 Capacity    15.5 L


 


Hgb O2 Saturation    89.1 L


 


FiO2    100.0


 


Sodium   143 


 


Potassium   3.6 


 


Chloride   113 H 


 


Carbon Dioxide   25 


 


Anion Gap   9 L 


 


BUN   12 


 


Creatinine   0.5 L 


 


Est GFR ( Amer)   > 60 


 


Est GFR (Non-Af Amer)   > 60 


 


Random Glucose   111 H 


 


Calcium   8.2 L 


 


Total Bilirubin   0.6 


 


AST   67 H D 


 


ALT   42 


 


Alkaline Phosphatase   365 H D 


 


Total Protein   5.5 L 


 


Albumin   2.6 L 


 


Globulin   2.9 


 


Albumin/Globulin Ratio   0.9 L 


 


Urine Color   


 


Urine Appearance   


 


Urine pH   


 


Ur Specific Gravity   


 


Urine Protein   


 


Urine Glucose (UA)   


 


Urine Ketones   


 


Urine Blood   


 


Urine Nitrate   


 


Urine Bilirubin   


 


Urine Urobilinogen   


 


Ur Leukocyte Esterase   


 


Urine RBC   


 


Urine WBC   


 


Ur Epithelial Cells   


 


Urine Bacteria   


 


Blood Type   


 


Antibody Screen   


 


Crossmatch   


 


BBK History Checked   














  03/14/18 03/14/18





  21:28 21:28


 


WBC  11.9 H 


 


RBC  3.48 L 


 


Hgb  11.7 L D 


 


Hct  35.9 L 


 


MCV  103.2  D 


 


MCH  33.6 


 


MCHC  32.6 


 


RDW  23.6 H 


 


Plt Count  227 


 


MPV  10.8 


 


Gran %  80.0 H 


 


Lymph % (Auto)  8.3 L 


 


Mono % (Auto)  11.3 H 


 


Eos % (Auto)  0.1 L 


 


Baso % (Auto)  0.3 


 


Gran #  9.55 H 


 


Lymph # (Auto)  1.0 L 


 


Mono # (Auto)  1.4 H 


 


Eos # (Auto)  0.0 


 


Baso # (Auto)  0.04 


 


PT  


 


INR  


 


pCO2  


 


pO2  


 


HCO3  


 


ABG pH  


 


ABG Total CO2  


 


ABG O2 Saturation  


 


ABG O2 Content  


 


ABG Base Excess  


 


ABG Hemoglobin  


 


ABG Carboxyhemoglobin  


 


POC ABG HHb (Measured)  


 


ABG Methemoglobin  


 


ABG O2 Capacity  


 


Hgb O2 Saturation  


 


FiO2  


 


Sodium   145


 


Potassium   3.6


 


Chloride   116 H


 


Carbon Dioxide   18 L


 


Anion Gap   14


 


BUN   16


 


Creatinine   0.6 L


 


Est GFR ( Amer)   > 60


 


Est GFR (Non-Af Amer)   > 60


 


Random Glucose   86


 


Calcium   8.0 L


 


Total Bilirubin  


 


AST  


 


ALT  


 


Alkaline Phosphatase  


 


Total Protein  


 


Albumin  


 


Globulin  


 


Albumin/Globulin Ratio  


 


Urine Color  


 


Urine Appearance  


 


Urine pH  


 


Ur Specific Gravity  


 


Urine Protein  


 


Urine Glucose (UA)  


 


Urine Ketones  


 


Urine Blood  


 


Urine Nitrate  


 


Urine Bilirubin  


 


Urine Urobilinogen  


 


Ur Leukocyte Esterase  


 


Urine RBC  


 


Urine WBC  


 


Ur Epithelial Cells  


 


Urine Bacteria  


 


Blood Type  


 


Antibody Screen  


 


Crossmatch  


 


BBK History Checked  














Assessment & Plan





- Assessment and Plan (Free Text)


Assessment: 





76 yo Caucian Female with PMH of severe PVD, mesenteric ischemia s/p SMA stent w

/ history of ischemic colitis s/p R hemicoelctomy s/p ileostomy w/ reversal, 

CAD s/p NSTEMI and TIA s/p R hip ORF for fracture s/p mechanical fall being 

transferred to ICU for hypoxia while in PACU. In OR, patient received fluids 

and 2u pRBC without any Lasix given. Given Lasix in PACU with some improvement. 





Plan:





Neuro:


currently altered, unknown if baseline dementia


neurochecks q2 


monitor mental status


transfer patient to ICU for monitoring


morphine prn pain but will limit narcotics to assess mental status 





Cardio:


limit fluid intake 


patient received Lasix 20mg IVP x2 with improvement


maintain MAP > 65


monitor for distal pulses


monitor VS 





Pulm:


CXR showed Left lobe congestion and possible effusion


A-a gradient calculated and is high


CTA chest (PE protocol) ordered STAT to r/o embolism given hx of fracture


cont on non-rebreather


repeat ABG in AM


low threshold for intubation given hypoxia and AMS


maintain SaO2 > 90%


encourage incentive spirometer 





GI:


NPO due to aspiration precautions


cont GI ppx 





Renal:


Lasix given


monitor for UOP


steve catheter in place


monitor for electrolyte changes and replete accordingly 





ID:


afebrile


monitor for signs of infection


tylenol prn fevers


maintain normothermia 


on Ancef for empiric coverage 





Endo:


maintain euglycemia





Heme:


monitor H/H


currently stable s/p 2u pRBC


if need to transfuse again if Hgb < 7, give lasix after


monitor morning labs 





PTX/SCDs for GI/DVT ppx 





Patient was seen, examined and discussed with attending, Dr. uRby Graves PGY1

## 2018-03-15 LAB
ALBUMIN SERPL-MCNC: 2.8 G/DL (ref 3–4.8)
ALBUMIN/GLOB SERPL: 1 {RATIO} (ref 1.1–1.8)
ALT SERPL-CCNC: 47 U/L (ref 7–56)
ARTERIAL BLOOD GAS HEMOGLOBIN: 11.2 G/DL (ref 11.7–17.4)
ARTERIAL BLOOD GAS O2 SAT: 97.9 % (ref 95–98)
ARTERIAL BLOOD GAS PCO2: 29 MM/HG (ref 35–45)
ARTERIAL BLOOD GAS TCO2: 18.9 MMOL.L (ref 22–28)
AST SERPL-CCNC: 116 U/L (ref 14–36)
BASOPHILS # BLD AUTO: 0.02 K/MM3 (ref 0–2)
BASOPHILS # BLD AUTO: 0.06 K/MM3 (ref 0–2)
BASOPHILS NFR BLD: 0.2 % (ref 0–3)
BASOPHILS NFR BLD: 0.4 % (ref 0–3)
BUN SERPL-MCNC: 14 MG/DL (ref 7–21)
CALCIUM SERPL-MCNC: 8 MG/DL (ref 8.4–10.5)
EOSINOPHIL # BLD: 0 10*3/UL (ref 0–0.7)
EOSINOPHIL # BLD: 0.1 10*3/UL (ref 0–0.7)
EOSINOPHIL NFR BLD: 0.2 % (ref 1.5–5)
EOSINOPHIL NFR BLD: 0.6 % (ref 1.5–5)
ERYTHROCYTE [DISTWIDTH] IN BLOOD BY AUTOMATED COUNT: 22.9 % (ref 11.5–14.5)
ERYTHROCYTE [DISTWIDTH] IN BLOOD BY AUTOMATED COUNT: 24.1 % (ref 11.5–14.5)
GFR NON-AFRICAN AMERICAN: > 60
GRANULOCYTES # BLD: 11.71 10*3/UL (ref 1.4–6.5)
GRANULOCYTES # BLD: 9.74 10*3/UL (ref 1.4–6.5)
GRANULOCYTES NFR BLD: 76.3 % (ref 50–68)
GRANULOCYTES NFR BLD: 80.5 % (ref 50–68)
HCO3 BLDA-SCNC: 18 MMOL/L (ref 21–28)
HGB BLD-MCNC: 11.7 G/DL (ref 12–16)
HGB BLD-MCNC: 9.4 G/DL (ref 12–16)
INHALED O2 CONCENTRATION: 100 %
INR PPP: 0.97 (ref 0.93–1.08)
LYMPHOCYTES # BLD: 0.9 10*3/UL (ref 1.2–3.4)
LYMPHOCYTES # BLD: 1 10*3/UL (ref 1.2–3.4)
LYMPHOCYTES NFR BLD AUTO: 6.5 % (ref 22–35)
LYMPHOCYTES NFR BLD AUTO: 7.5 % (ref 22–35)
MCH RBC QN AUTO: 32.9 PG (ref 25–35)
MCH RBC QN AUTO: 33.4 PG (ref 25–35)
MCHC RBC AUTO-ENTMCNC: 32.7 G/DL (ref 31–37)
MCHC RBC AUTO-ENTMCNC: 32.8 G/DL (ref 31–37)
MCV RBC AUTO: 100.3 FL (ref 80–105)
MCV RBC AUTO: 102.3 FL (ref 80–105)
MONOCYTES # BLD AUTO: 1.7 10*3/UL (ref 0.1–0.6)
MONOCYTES # BLD AUTO: 2 10*3/UL (ref 0.1–0.6)
MONOCYTES NFR BLD: 12 % (ref 1–6)
MONOCYTES NFR BLD: 15.8 % (ref 1–6)
O2 CAP BLDA-SCNC: 15.4 ML/DL (ref 16–24)
O2 CT BLDA-SCNC: 15.1 ML/DL (ref 15–23)
PH BLDA: 7.4 [PH] (ref 7.35–7.45)
PLATELET # BLD: 207 10^3/UL (ref 120–450)
PLATELET # BLD: 243 10^3/UL (ref 120–450)
PMV BLD AUTO: 10.7 FL (ref 7–11)
PMV BLD AUTO: 11.3 FL (ref 7–11)
PO2 BLDA: 81 MM/HG (ref 80–100)
PROTHROMBIN TIME: 11.2 SECONDS (ref 9.4–12.5)
RBC # BLD AUTO: 2.86 10^6/UL (ref 3.5–6.1)
RBC # BLD AUTO: 3.5 10^6/UL (ref 3.5–6.1)
WBC # BLD AUTO: 12.8 10^3/UL (ref 4.5–11)
WBC # BLD AUTO: 14.5 10^3/UL (ref 4.5–11)

## 2018-03-15 RX ADMIN — CEFAZOLIN SCH MLS/HR: 330 INJECTION, POWDER, FOR SOLUTION INTRAMUSCULAR; INTRAVENOUS at 05:12

## 2018-03-15 RX ADMIN — MORPHINE SULFATE PRN MG: 2 INJECTION, SOLUTION INTRAMUSCULAR; INTRAVENOUS at 11:34

## 2018-03-15 RX ADMIN — HEPARIN SODIUM PRN MLS/HR: 10000 INJECTION, SOLUTION INTRAVENOUS at 02:24

## 2018-03-15 RX ADMIN — MORPHINE SULFATE PRN MG: 2 INJECTION, SOLUTION INTRAMUSCULAR; INTRAVENOUS at 18:32

## 2018-03-15 RX ADMIN — CEFAZOLIN SCH MLS/HR: 330 INJECTION, POWDER, FOR SOLUTION INTRAMUSCULAR; INTRAVENOUS at 00:29

## 2018-03-15 RX ADMIN — CEFTRIAXONE SCH MLS/HR: 1 INJECTION, SOLUTION INTRAVENOUS at 10:47

## 2018-03-15 NOTE — OP
PROCEDURE DATE:



PREOPERATIVE DIAGNOSIS:  Intertrochanteric and subtrochanteric fracture,

right hip.



POSTOPERATIVE DIAGNOSIS:  Intertrochanteric and subtrochanteric fracture,

right hip.



PROCEDURE:  Open reduction and internal fixation with a Biomet Affixus

nail, utilizing nail of 180 mm long, lag screw of  85 mm long and 125

degree angle, and one locking screw distally, 34 mm long.



DESCRIPTION OF PROCEDURE:  In summary, the patient was taken to the OR. 

Right hip was prepped and draped _____ in sterile fashion.  X-ray showed we

could reduce the leg with traction and rotation.  So, I made a 2 inch

incision, 4 cm above the greater trochanter, entered the fascia, penetrated

and go through with it. Threaded-tip guidewire down the shaft of the femur

and over reamed approximately 7 cm with a 16 mm reamer, then put in the

premeasured 180 mm long, 9 mm wide intramedullary linda and locked it

proximally with a second incision to go into the femoral head and neck

along the calcar.  Then, once the fracture was impacted, we locked it

distally with a 34 mm cortical screw.  All 3 wounds were irrigated with

normal saline and closed in layers starting with 0-Vicryl for deep layer

interrupted, skin with 2-0 nylon interrupted and sterile dressings applied.

Patient was taken to recovery room in good condition.





__________________________________________

Orlando Slade DO





DD:  03/14/2018 19:50:24

DT:  03/15/2018 0:36:47

Job # [id number]

## 2018-03-15 NOTE — RAD
PROCEDURE:  Fluoroscopy up to 1 hour



HISTORY:

O.R.I.F.  RIGHT HIP  FX.



COMPARISON:





TECHNIQUE:

Fluoroscopy was provided in the operating room. 78.9 seconds of 

fluoro time. Four images were submitted



FINDINGS:

There is a compression screw and linda in the right hip with fixation 

of an intertrochanteric fracture.  There are no complicating factors



IMPRESSION:

As above

## 2018-03-15 NOTE — PN
SUBJECTIVE:  

The patient was seen and examined at the bedside in the CCU. The patient is s/p 
ORIF of her right femoral fracture POD #1.  She tolerated the procedure and was 
doing well until several hours postoperatively when she developed respiratory 
distress.  She was noted to be tachypneic and utilizing accessory muscles of 
respiration.  Bedside pulse oximetry was low thus an ABG was obtained which 
demonstrated hypoxia with a pO2 of 56.  She was placed on a NRB mask and sent 
for a CT chest with contrast which showed pulmonary emboli to the right main, 
upper and lower pulmonary arteries as well as extensive left pulmonary air 
space consolidation consistent with pneumonia.  She was promptly evaluated by 
the ICU team and transferred to the CCU, where she was started on a heparin 
drip.  This morning she is sitting up comfortably in her chair and offers no 
complaints.



OBJECTIVE:

VITAL SIGNS:  Temperature 98.1, pulse 76, blood pressure 126/49, respiratory 
rate 20, oxygen saturation 97% on 3 liters nasal cannula.

GENERAL:  Frail, elderly, chronically ill-appearing woman lying in bed in no 
apparent distress.

HEENT:  PERRL.  EOMI.  No scleral icterus.  Mild conjunctival pallor is noted.  
Poor dentition is noted.

NECK:  No JVD.

LUNGS:  Decreased breath sounds at the bases with crackles to left lower lobe.

CARDIOVASCULAR:  Regular rate and rhythm.  Normal S1 and S2.

ABDOMEN:  Normoactive bowel sounds.  Soft, nontender, nondistended.

EXTREMITIES:  No edema.  Surgical site to the right hip appears clean, dry, and 
intact.

NEUROLOGIC:  Awake, alert and oriented x3.  No focal motor deficits.

SKIN: Dry with poor turgor and multiple areas of ecchymoses to her b/l upper 
extremities.



LABORATORY DATA:  

WBC 14.5 with 80% neutrophils, hemoglobin 11.7, hematocrit 36, platelets 243.  

Sodium 142, potassium 3.2, chloride 109, bicarb 21, BUN 14, creatinine 0.5, 
glucose 56.  

ABG demonstrates pH 7.4, pO2 81, pCO2 29, and 100% FiO2.



ASSESSMENT:  

The patient is a 77 year old woman with a past medical history of CAD s/p NSTEMI
, severe PVD, history of TIA and history of mesenteric ischemia s/p right 
hemicolectomy s/p ileostomy with reversal who presented s/p mechanical fall 
with a resultant right femoral fracture who is now s/p ORIF POD #1 whose 
hospital course was complicated by development of right pulmonary embolism and 
left lower lobe pneumonia.



PLAN:

1.  Acute closed fracture of the right femur s/p ORIF POD #1. Continue with 
postsurgical care as per Dr. Slade.  PT evaluation is pending.  
Analgesics as per Dr. Slade.  Continue bowel regimen.

2.  Pulmonary embolism, right-sided.  The patient has been started on a heparin 
drip and will need to be bridged to Coumadin.  She will require approximately 3-
6 months of anticoagulation given that this is a provoked PE.  Continue with 
supplemental oxygen as needed and close pulse oximetry monitoring.

3.  Left lower lobe pneumonia.  Start Ceftriaxone 1 g IV daily and Azithromycin 
500 mg p.o. daily.  Continue to monitor her fever and leukocytosis.  Cultures 
are pending.

4.  CAD s/p NSTEMI.  Continue Lipitor 80 mg p.o. daily.

5.  History of TIA.  Continue Lipitor 80 mg p.o. daily.

6.  Severe PVD.  Continue Lipitor 80 mg p.o. daily.

7.  Anemia of chronic disease.  The patient has been transfused a total of 4 
units of PRBC's with an appropriate response in her Hb. Continue to monitor H/H 
daily and transfuse as needed.

8.  Prophylaxis.  Continue with Protonix 40 mg IV daily for GI prophylaxis. The 
patient remains on therapeutic heparin thus DVT prophylaxis is not indicated.





CODE STATUS:  Full code.





__________________________________________

Jose Elias Edwards MD



DD:  03/15/2018 8:31:36

DT:  03/15/2018 9:47:55

Job # [id number]

[id number]

## 2018-03-15 NOTE — CP.CCUPN
<Kevin Riddle - Last Filed: 03/15/18 10:32>





CCU Subjective





- Physician Review


Subjective (Free Text): 





Patient seen and examined at bedside. Patient has no respiratory symptoms at 

this time. Denies chest pain, shortness of breath, nausea, vomiting, diarrhea, 

fever, chills, cough. 








CCU Objective





- Vital Signs / Intake & Output


Intake and Output (Last 8hrs): 


 Intake & Output











 03/14/18 03/15/18 03/15/18





 22:59 06:59 14:59


 


Intake Total 700  


 


Balance 700  


 


Weight  92 lb 


 


Intake:   


 


  Blood Product 650  


 


    Red Blood Cells Cpd As1 325  





    Lr  Unit [id number]   


 


    Red Blood Cells Cpd As1 325  





    Lr  Unit [id number]   


 


  Other 50  


 


    Red Blood Cells Cpd As1 50  





    Lr  Unit [id number]   














- Physical Exam


Head: Positive for: Atraumatic, Normocephalic


Pupils: Positive for: PERRL


Extroacular Muscles: Positive for: EOMI


Conjunctiva: Positive for: Normal


Mouth: Positive for: Moist Mucous Membranes


Neck: Positive for: Normal Range of Motion


Respiratory/Chest: Positive for: Clear to Auscultation, Decreased Breath 

Sounds.  Negative for: Respiratory Distress, Accessory Muscle Use


Cardiovascular: Positive for: Regular Rate and Rhythm, Normal S1, S2.  Negative 

for: Murmurs


Abdomen: Positive for: Normal Bowel Sounds.  Negative for: Tenderness, 

Distention, Peritoneal Signs


Back: Positive for: Normal Inspection


Upper Extremity: Positive for: Other (ecchymosis)


Lower Extremity: Positive for: Other (Bandages along right leg)


Neurological: Positive for: GCS=15, CN II-XII Intact, Speech Normal


Skin: Positive for: Warm, Dry, Normal Color.  Negative for: Rashes


Psychiatric: Positive for: Alert, Oriented x 3, Normal Insight, Normal 

Concentration





- Medications


Active Medications: 


Active Medications











Generic Name Dose Route Start Last Admin





  Trade Name Freq  PRN Reason Stop Dose Admin


 


Acetaminophen  650 mg  03/13/18 17:11  





  Tylenol 325mg Tab  PO   





  Q6H PRN   





  Fever >100.4 F   


 


Acetaminophen  650 mg  03/15/18 08:11  





  Tylenol 325mg Tab  PO   





  Q6H PRN   





  Pain, Mild (1-3)   


 


Acetaminophen  650 mg  03/15/18 08:12  





  Tylenol 650 Mg Supp  RC   





  Q4H PRN   





  Fever>100.4F,if can't take PO   


 


Atorvastatin Calcium  80 mg  03/14/18 17:00  03/14/18 17:26





  Lipitor  PO   Not Given





  DIN [facility]   


 


Azithromycin  500 mg  03/15/18 10:00  





  Zithromax  PO   





  DAILY [facility]   





  Protocol   


 


Heparin Sodium/Sodium Chloride  25,000 units in 250 mls @ 7.756 mls/hr  03/15/

18 01:30  03/15/18 02:24





  Heparin 34549 Units/250ml 1/2 Normal Saline  IV   18 units/kg/hr





  .Q24H PRN   7.756 mls/hr





  ADJUST RATE PER PROTOCOL   Administration





  Protocol   





  18 UNITS/KG/HR   


 


Ceftriaxone Sodium  1 gm in 100 mls @ 100 mls/hr  03/15/18 10:00  





  Rocephin 1 Gram Ivpb  IVPB   





  DAILY [facility]   





  Protocol   


 


Metoprolol Tartrate  12.5 mg  03/13/18 18:00  03/14/18 17:43





  Lopressor  PO   12.5 mg





  BID PATRICK   Administration


 


Morphine Sulfate  2 mg  03/15/18 10:14  





  Morphine  IVP   





  Q4H PRN   





  Pain, severe (8-10)   


 


Pantoprazole Sodium  40 mg  03/14/18 10:00  03/14/18 11:50





  Protonix Inj  IVP   40 mg





  DAILY PATRICK   Administration


 


Tramadol HCl  50 mg  03/13/18 17:12  03/15/18 10:09





  Ultram  PO   50 mg





  Q8 PRN   Administration





  Pain, moderate (4-7)   














- Patient Studies


Lab Studies: 


 Lab Studies











  03/15/18 03/15/18 03/15/18 Range/Units





  07:50 06:30 05:50 


 


WBC     (4.5-11.0)  10^3/ul


 


RBC     (3.5-6.1)  10^6/uL


 


Hgb     (12.0-16.0)  g/dL


 


Hct     (36.0-48.0)  %


 


MCV     (80.0-105.0)  fl


 


MCH     (25.0-35.0)  pg


 


MCHC     (31.0-37.0)  g/dl


 


RDW     (11.5-14.5)  %


 


Plt Count     (120.0-450.0)  10^3/uL


 


MPV     (7.0-11.0)  fl


 


Gran %     (50.0-68.0)  %


 


Lymph % (Auto)     (22.0-35.0)  %


 


Mono % (Auto)     (1.0-6.0)  %


 


Eos % (Auto)     (1.5-5.0)  %


 


Baso % (Auto)     (0.0-3.0)  %


 


Gran #     (1.4-6.5)  


 


Lymph # (Auto)     (1.2-3.4)  


 


Mono # (Auto)     (0.1-0.6)  


 


Eos # (Auto)     (0.0-0.7)  


 


Baso # (Auto)     (0.0-2.0)  K/mm3


 


PT     (9.4-12.5)  SECONDS


 


INR     (0.93-1.08)  


 


APTT  > 400.0 H*    (25.1-36.5)  Seconds


 


pCO2   29 L   (35-45)  mm/Hg


 


pO2   81.0   ()  mm/Hg


 


HCO3   18.0 L   (21-28)  mmol/L


 


ABG pH   7.40   (7.35-7.45)  


 


ABG Total CO2   18.9 L   (22-28)  mmol.L


 


ABG O2 Saturation   97.9   (95-98)  %


 


ABG O2 Content   15.1   (15-23)  ML/dl


 


ABG Base Excess   -5.7 L   (-2.0-3.0)  mmol/L


 


ABG Hemoglobin   11.2 L   (11.7-17.4)  g/dL


 


ABG Carboxyhemoglobin   1.6 H   (0.5-1.5)  %


 


POC ABG HHb (Measured)   2.0   (0-5)  %


 


ABG Methemoglobin   1.2   (0.0-3.0)  %


 


ABG O2 Capacity   15.4 L   (16-24)  mL/dl


 


Hgb O2 Saturation   95.3   (95.0-98.0)  %


 


FiO2   100.0   %


 


Sodium    142  (132-148)  mmol/L


 


Potassium    3.1 L  (3.6-5.0)  mmol/L


 


Chloride    109 H  ()  mmol/L


 


Carbon Dioxide    21  (21-33)  mmol/L


 


Anion Gap    16  (10-20)  


 


BUN    14  (7-21)  mg/dL


 


Creatinine    0.5 L  (0.7-1.2)  mg/dl


 


Est GFR (African Amer)    > 60  


 


Est GFR (Non-Af Amer)    > 60  


 


Random Glucose    56 L  ()  mg/dL


 


Calcium    8.0 L  (8.4-10.5)  mg/dL


 


Total Bilirubin    0.6  (0.2-1.3)  mg/dL


 


AST    116 H D  (14-36)  U/L


 


ALT    47  (7-56)  U/L


 


Alkaline Phosphatase    400 H  ()  U/L


 


Total Protein    5.7 L  (5.8-8.3)  g/dL


 


Albumin    2.8 L  (3.0-4.8)  g/dL


 


Globulin    2.9  gm/dL


 


Albumin/Globulin Ratio    1.0 L  (1.1-1.8)  


 


Blood Type     


 


Antibody Screen     


 


Crossmatch     


 


BBK History Checked     














  03/15/18 03/15/18 03/14/18 Range/Units





  05:50 05:50 21:28 


 


WBC   14.5 H D   (4.5-11.0)  10^3/ul


 


RBC   3.50   (3.5-6.1)  10^6/uL


 


Hgb   11.7 L   (12.0-16.0)  g/dL


 


Hct   35.8 L   (36.0-48.0)  %


 


MCV   102.3   (80.0-105.0)  fl


 


MCH   33.4   (25.0-35.0)  pg


 


MCHC   32.7   (31.0-37.0)  g/dl


 


RDW   24.1 H   (11.5-14.5)  %


 


Plt Count   243   (120.0-450.0)  10^3/uL


 


MPV   11.3 H   (7.0-11.0)  fl


 


Gran %   80.5 H   (50.0-68.0)  %


 


Lymph % (Auto)   6.5 L   (22.0-35.0)  %


 


Mono % (Auto)   12.0 H   (1.0-6.0)  %


 


Eos % (Auto)   0.6 L   (1.5-5.0)  %


 


Baso % (Auto)   0.4   (0.0-3.0)  %


 


Gran #   11.71 H   (1.4-6.5)  


 


Lymph # (Auto)   0.9 L   (1.2-3.4)  


 


Mono # (Auto)   1.7 H   (0.1-0.6)  


 


Eos # (Auto)   0.1   (0.0-0.7)  


 


Baso # (Auto)   0.06   (0.0-2.0)  K/mm3


 


PT  11.2    (9.4-12.5)  SECONDS


 


INR  0.97    (0.93-1.08)  


 


APTT     (25.1-36.5)  Seconds


 


pCO2     (35-45)  mm/Hg


 


pO2     ()  mm/Hg


 


HCO3     (21-28)  mmol/L


 


ABG pH     (7.35-7.45)  


 


ABG Total CO2     (22-28)  mmol.L


 


ABG O2 Saturation     (95-98)  %


 


ABG O2 Content     (15-23)  ML/dl


 


ABG Base Excess     (-2.0-3.0)  mmol/L


 


ABG Hemoglobin     (11.7-17.4)  g/dL


 


ABG Carboxyhemoglobin     (0.5-1.5)  %


 


POC ABG HHb (Measured)     (0-5)  %


 


ABG Methemoglobin     (0.0-3.0)  %


 


ABG O2 Capacity     (16-24)  mL/dl


 


Hgb O2 Saturation     (95.0-98.0)  %


 


FiO2     %


 


Sodium    145  (132-148)  mmol/L


 


Potassium    3.6  (3.6-5.0)  mmol/L


 


Chloride    116 H  ()  mmol/L


 


Carbon Dioxide    18 L  (21-33)  mmol/L


 


Anion Gap    14  (10-20)  


 


BUN    16  (7-21)  mg/dL


 


Creatinine    0.6 L  (0.7-1.2)  mg/dl


 


Est GFR (African Amer)    > 60  


 


Est GFR (Non-Af Amer)    > 60  


 


Random Glucose    86  ()  mg/dL


 


Calcium    8.0 L  (8.4-10.5)  mg/dL


 


Total Bilirubin     (0.2-1.3)  mg/dL


 


AST     (14-36)  U/L


 


ALT     (7-56)  U/L


 


Alkaline Phosphatase     ()  U/L


 


Total Protein     (5.8-8.3)  g/dL


 


Albumin     (3.0-4.8)  g/dL


 


Globulin     gm/dL


 


Albumin/Globulin Ratio     (1.1-1.8)  


 


Blood Type     


 


Antibody Screen     


 


Crossmatch     


 


BBK History Checked     














  03/14/18 03/14/18 03/14/18 Range/Units





  21:28 21:20 05:30 


 


WBC  11.9 H    (4.5-11.0)  10^3/ul


 


RBC  3.48 L    (3.5-6.1)  10^6/uL


 


Hgb  11.7 L D    (12.0-16.0)  g/dL


 


Hct  35.9 L    (36.0-48.0)  %


 


MCV  103.2  D    (80.0-105.0)  fl


 


MCH  33.6    (25.0-35.0)  pg


 


MCHC  32.6    (31.0-37.0)  g/dl


 


RDW  23.6 H    (11.5-14.5)  %


 


Plt Count  227    (120.0-450.0)  10^3/uL


 


MPV  10.8    (7.0-11.0)  fl


 


Gran %  80.0 H    (50.0-68.0)  %


 


Lymph % (Auto)  8.3 L    (22.0-35.0)  %


 


Mono % (Auto)  11.3 H    (1.0-6.0)  %


 


Eos % (Auto)  0.1 L    (1.5-5.0)  %


 


Baso % (Auto)  0.3    (0.0-3.0)  %


 


Gran #  9.55 H    (1.4-6.5)  


 


Lymph # (Auto)  1.0 L    (1.2-3.4)  


 


Mono # (Auto)  1.4 H    (0.1-0.6)  


 


Eos # (Auto)  0.0    (0.0-0.7)  


 


Baso # (Auto)  0.04    (0.0-2.0)  K/mm3


 


PT     (9.4-12.5)  SECONDS


 


INR     (0.93-1.08)  


 


APTT     (25.1-36.5)  Seconds


 


pCO2   32 L   (35-45)  mm/Hg


 


pO2   56.0 L   ()  mm/Hg


 


HCO3   16.9 L   (21-28)  mmol/L


 


ABG pH   7.33 L   (7.35-7.45)  


 


ABG Total CO2   17.9 L   (22-28)  mmol.L


 


ABG O2 Saturation   91.6 L   (95-98)  %


 


ABG O2 Content   14.2 L   (15-23)  ML/dl


 


ABG Base Excess   -8.1 L   (-2.0-3.0)  mmol/L


 


ABG Hemoglobin   11.3 L   (11.7-17.4)  g/dL


 


ABG Carboxyhemoglobin   1.9 H   (0.5-1.5)  %


 


POC ABG HHb (Measured)   8.2 H   (0-5)  %


 


ABG Methemoglobin   0.8   (0.0-3.0)  %


 


ABG O2 Capacity   15.5 L   (16-24)  mL/dl


 


Hgb O2 Saturation   89.1 L   (95.0-98.0)  %


 


FiO2   100.0   %


 


Sodium     (132-148)  mmol/L


 


Potassium     (3.6-5.0)  mmol/L


 


Chloride     ()  mmol/L


 


Carbon Dioxide     (21-33)  mmol/L


 


Anion Gap     (10-20)  


 


BUN     (7-21)  mg/dL


 


Creatinine     (0.7-1.2)  mg/dl


 


Est GFR (African Amer)     


 


Est GFR (Non-Af Amer)     


 


Random Glucose     ()  mg/dL


 


Calcium     (8.4-10.5)  mg/dL


 


Total Bilirubin     (0.2-1.3)  mg/dL


 


AST     (14-36)  U/L


 


ALT     (7-56)  U/L


 


Alkaline Phosphatase     ()  U/L


 


Total Protein     (5.8-8.3)  g/dL


 


Albumin     (3.0-4.8)  g/dL


 


Globulin     gm/dL


 


Albumin/Globulin Ratio     (1.1-1.8)  


 


Blood Type    O POSITIVE  


 


Antibody Screen    Negative  


 


Crossmatch    See Detail  


 


BBK History Checked    Patient has bt  








 Laboratory Results - last 24 hr











  03/14/18 03/14/18 03/14/18





  05:30 21:20 21:28


 


WBC    11.9 H


 


RBC    3.48 L


 


Hgb    11.7 L D


 


Hct    35.9 L


 


MCV    103.2  D


 


MCH    33.6


 


MCHC    32.6


 


RDW    23.6 H


 


Plt Count    227


 


MPV    10.8


 


Gran %    80.0 H


 


Lymph % (Auto)    8.3 L


 


Mono % (Auto)    11.3 H


 


Eos % (Auto)    0.1 L


 


Baso % (Auto)    0.3


 


Gran #    9.55 H


 


Lymph # (Auto)    1.0 L


 


Mono # (Auto)    1.4 H


 


Eos # (Auto)    0.0


 


Baso # (Auto)    0.04


 


PT   


 


INR   


 


APTT   


 


pCO2   32 L 


 


pO2   56.0 L 


 


HCO3   16.9 L 


 


ABG pH   7.33 L 


 


ABG Total CO2   17.9 L 


 


ABG O2 Saturation   91.6 L 


 


ABG O2 Content   14.2 L 


 


ABG Base Excess   -8.1 L 


 


ABG Hemoglobin   11.3 L 


 


ABG Carboxyhemoglobin   1.9 H 


 


POC ABG HHb (Measured)   8.2 H 


 


ABG Methemoglobin   0.8 


 


ABG O2 Capacity   15.5 L 


 


Hgb O2 Saturation   89.1 L 


 


FiO2   100.0 


 


Sodium   


 


Potassium   


 


Chloride   


 


Carbon Dioxide   


 


Anion Gap   


 


BUN   


 


Creatinine   


 


Est GFR ( Amer)   


 


Est GFR (Non-Af Amer)   


 


Random Glucose   


 


Calcium   


 


Total Bilirubin   


 


AST   


 


ALT   


 


Alkaline Phosphatase   


 


Total Protein   


 


Albumin   


 


Globulin   


 


Albumin/Globulin Ratio   


 


Blood Type  O POSITIVE  


 


Antibody Screen  Negative  


 


Crossmatch  See Detail  


 


BBK History Checked  Patient has bt  














  03/14/18 03/15/18 03/15/18





  21:28 05:50 05:50


 


WBC   14.5 H D 


 


RBC   3.50 


 


Hgb   11.7 L 


 


Hct   35.8 L 


 


MCV   102.3 


 


MCH   33.4 


 


MCHC   32.7 


 


RDW   24.1 H 


 


Plt Count   243 


 


MPV   11.3 H 


 


Gran %   80.5 H 


 


Lymph % (Auto)   6.5 L 


 


Mono % (Auto)   12.0 H 


 


Eos % (Auto)   0.6 L 


 


Baso % (Auto)   0.4 


 


Gran #   11.71 H 


 


Lymph # (Auto)   0.9 L 


 


Mono # (Auto)   1.7 H 


 


Eos # (Auto)   0.1 


 


Baso # (Auto)   0.06 


 


PT    11.2


 


INR    0.97


 


APTT   


 


pCO2   


 


pO2   


 


HCO3   


 


ABG pH   


 


ABG Total CO2   


 


ABG O2 Saturation   


 


ABG O2 Content   


 


ABG Base Excess   


 


ABG Hemoglobin   


 


ABG Carboxyhemoglobin   


 


POC ABG HHb (Measured)   


 


ABG Methemoglobin   


 


ABG O2 Capacity   


 


Hgb O2 Saturation   


 


FiO2   


 


Sodium  145  


 


Potassium  3.6  


 


Chloride  116 H  


 


Carbon Dioxide  18 L  


 


Anion Gap  14  


 


BUN  16  


 


Creatinine  0.6 L  


 


Est GFR ( Amer)  > 60  


 


Est GFR (Non-Af Amer)  > 60  


 


Random Glucose  86  


 


Calcium  8.0 L  


 


Total Bilirubin   


 


AST   


 


ALT   


 


Alkaline Phosphatase   


 


Total Protein   


 


Albumin   


 


Globulin   


 


Albumin/Globulin Ratio   


 


Blood Type   


 


Antibody Screen   


 


Crossmatch   


 


BBK History Checked   














  03/15/18 03/15/18 03/15/18





  05:50 06:30 07:50


 


WBC   


 


RBC   


 


Hgb   


 


Hct   


 


MCV   


 


MCH   


 


MCHC   


 


RDW   


 


Plt Count   


 


MPV   


 


Gran %   


 


Lymph % (Auto)   


 


Mono % (Auto)   


 


Eos % (Auto)   


 


Baso % (Auto)   


 


Gran #   


 


Lymph # (Auto)   


 


Mono # (Auto)   


 


Eos # (Auto)   


 


Baso # (Auto)   


 


PT   


 


INR   


 


APTT    > 400.0 H*


 


pCO2   29 L 


 


pO2   81.0 


 


HCO3   18.0 L 


 


ABG pH   7.40 


 


ABG Total CO2   18.9 L 


 


ABG O2 Saturation   97.9 


 


ABG O2 Content   15.1 


 


ABG Base Excess   -5.7 L 


 


ABG Hemoglobin   11.2 L 


 


ABG Carboxyhemoglobin   1.6 H 


 


POC ABG HHb (Measured)   2.0 


 


ABG Methemoglobin   1.2 


 


ABG O2 Capacity   15.4 L 


 


Hgb O2 Saturation   95.3 


 


FiO2   100.0 


 


Sodium  142  


 


Potassium  3.1 L  


 


Chloride  109 H  


 


Carbon Dioxide  21  


 


Anion Gap  16  


 


BUN  14  


 


Creatinine  0.5 L  


 


Est GFR ( Amer)  > 60  


 


Est GFR (Non-Af Amer)  > 60  


 


Random Glucose  56 L  


 


Calcium  8.0 L  


 


Total Bilirubin  0.6  


 


AST  116 H D  


 


ALT  47  


 


Alkaline Phosphatase  400 H  


 


Total Protein  5.7 L  


 


Albumin  2.8 L  


 


Globulin  2.9  


 


Albumin/Globulin Ratio  1.0 L  


 


Blood Type   


 


Antibody Screen   


 


Crossmatch   


 


BBK History Checked   














Critical Care Progress Note





- Nutrition


Nutrition: 


 Nutrition











 Category Date Time Status


 


 Heart Healthy Diet [DIET] Diets  03/14/18 Dinner Ordered


 


 NPO Diet [DIET] Diets  03/15/18 Dinner Ordered














Assessment/Plan





- Assessment and Plan (Free Text)


Plan: 





77 year old female with a PMH of severe PVD, mesenteric ischemia s/p SMA stent w

/ history of ischemic colitis s/p R hemicoelctomy s/p ileostomy w/ reversal, 

CAD s/p NSTEMI and TIA presents with right sided pulmonary embolism s/p right 

hip ORIF. Patient also found to have left lower lobe consolidation on CT chest, 

which is likely pneumonia. She will also have PICC line placed. Patient is 

currently stable and will be transferred to telemetry. 





Neurological 


AAOx3


No deficits





Cardiovascular 


Hemodynamically stable 


ASA and plavix


Lipitor and Metoprolol


Cardio following


PRN morphine for pain








Pulm


Rocephin and Azithromycin 


Continue Heparin drip


4L NC


Maintain MAP >90%





GI 


Heart healthy diet


Protonix








Nephro


Replenish electrolytes as needed


Maintain euvolemia





Endo 


Maintain euglycemia 





Heme 


Afebrile, no leukocytosis


Maintain normothermia 





Venkatesh, PGY-2





<Sean Means - Last Filed: 03/15/18 12:54>





CCU Objective





- Vital Signs / Intake & Output


Vital Signs (Last 4 hours): 


Vital Signs











  Pulse BP


 


 03/15/18 10:38  89  114/55 L











Intake and Output (Last 8hrs): 


 Intake & Output











 03/14/18 03/15/18 03/15/18





 22:59 06:59 14:59


 


Intake Total 700  


 


Balance 700  


 


Weight  92 lb 


 


Intake:   


 


  Blood Product 650  


 


    Red Blood Cells Cpd As1 325  





    Lr  Unit [id number]   


 


    Red Blood Cells Cpd As1 325  





    Lr  Unit [id number]   


 


  Other 50  


 


    Red Blood Cells Cpd As1 50  





    Lr  Unit [id number]   














- Medications


Active Medications: 


Active Medications











Generic Name Dose Route Start Last Admin





  Trade Name Freq  PRN Reason Stop Dose Admin


 


Acetaminophen  650 mg  03/13/18 17:11  





  Tylenol 325mg Tab  PO   





  Q6H PRN   





  Fever >100.4 F   


 


Acetaminophen  650 mg  03/15/18 08:11  





  Tylenol 325mg Tab  PO   





  Q6H PRN   





  Pain, Mild (1-3)   


 


Acetaminophen  650 mg  03/15/18 08:12  





  Tylenol 650 Mg Supp  RC   





  Q4H PRN   





  Fever>100.4F,if can't take PO   


 


Atorvastatin Calcium  80 mg  03/14/18 17:00  03/14/18 17:26





  Lipitor  PO   Not Given





  DIN [facility]   


 


Azithromycin  500 mg  03/15/18 10:00  03/15/18 10:46





  Zithromax  PO   500 mg





  DAILY PATRICK   Administration





  Protocol   


 


Heparin Sodium/Sodium Chloride  25,000 units in 250 mls @ 7.756 mls/hr  03/15/

18 01:30  03/15/18 02:24





  Heparin 34529 Units/250ml 1/2 Normal Saline  IV   18 units/kg/hr





  .Q24H PRN   7.756 mls/hr





  ADJUST RATE PER PROTOCOL   Administration





  Protocol   





  18 UNITS/KG/HR   


 


Ceftriaxone Sodium  1 gm in 100 mls @ 100 mls/hr  03/15/18 10:00  03/15/18 10:47





  Rocephin 1 Gram Ivpb  IVPB   100 mls/hr





  DAILY PATRICK   Administration





  Protocol   


 


Metoprolol Tartrate  12.5 mg  03/13/18 18:00  03/15/18 10:38





  Lopressor  PO   12.5 mg





  BID PATRICK   Administration


 


Morphine Sulfate  2 mg  03/15/18 10:14  03/15/18 11:34





  Morphine  IVP   2 mg





  Q4H PRN   Administration





  Pain, severe (8-10)   


 


Pantoprazole Sodium  40 mg  03/14/18 10:00  03/15/18 10:38





  Protonix Inj  IVP   40 mg





  DAILY PATRICK   Administration


 


Tramadol HCl  50 mg  03/13/18 17:12  03/15/18 10:09





  Ultram  PO   50 mg





  Q8 PRN   Administration





  Pain, moderate (4-7)   














- Patient Studies


Lab Studies: 


 Lab Studies











  03/15/18 03/15/18 03/15/18 Range/Units





  07:50 06:30 05:50 


 


WBC     (4.5-11.0)  10^3/ul


 


RBC     (3.5-6.1)  10^6/uL


 


Hgb     (12.0-16.0)  g/dL


 


Hct     (36.0-48.0)  %


 


MCV     (80.0-105.0)  fl


 


MCH     (25.0-35.0)  pg


 


MCHC     (31.0-37.0)  g/dl


 


RDW     (11.5-14.5)  %


 


Plt Count     (120.0-450.0)  10^3/uL


 


MPV     (7.0-11.0)  fl


 


Gran %     (50.0-68.0)  %


 


Lymph % (Auto)     (22.0-35.0)  %


 


Mono % (Auto)     (1.0-6.0)  %


 


Eos % (Auto)     (1.5-5.0)  %


 


Baso % (Auto)     (0.0-3.0)  %


 


Gran #     (1.4-6.5)  


 


Lymph # (Auto)     (1.2-3.4)  


 


Mono # (Auto)     (0.1-0.6)  


 


Eos # (Auto)     (0.0-0.7)  


 


Baso # (Auto)     (0.0-2.0)  K/mm3


 


PT     (9.4-12.5)  SECONDS


 


INR     (0.93-1.08)  


 


APTT  > 400.0 H*    (25.1-36.5)  Seconds


 


pCO2   29 L   (35-45)  mm/Hg


 


pO2   81.0   ()  mm/Hg


 


HCO3   18.0 L   (21-28)  mmol/L


 


ABG pH   7.40   (7.35-7.45)  


 


ABG Total CO2   18.9 L   (22-28)  mmol.L


 


ABG O2 Saturation   97.9   (95-98)  %


 


ABG O2 Content   15.1   (15-23)  ML/dl


 


ABG Base Excess   -5.7 L   (-2.0-3.0)  mmol/L


 


ABG Hemoglobin   11.2 L   (11.7-17.4)  g/dL


 


ABG Carboxyhemoglobin   1.6 H   (0.5-1.5)  %


 


POC ABG HHb (Measured)   2.0   (0-5)  %


 


ABG Methemoglobin   1.2   (0.0-3.0)  %


 


ABG O2 Capacity   15.4 L   (16-24)  mL/dl


 


Hgb O2 Saturation   95.3   (95.0-98.0)  %


 


FiO2   100.0   %


 


Sodium    142  (132-148)  mmol/L


 


Potassium    3.1 L  (3.6-5.0)  mmol/L


 


Chloride    109 H  ()  mmol/L


 


Carbon Dioxide    21  (21-33)  mmol/L


 


Anion Gap    16  (10-20)  


 


BUN    14  (7-21)  mg/dL


 


Creatinine    0.5 L  (0.7-1.2)  mg/dl


 


Est GFR (African Amer)    > 60  


 


Est GFR (Non-Af Amer)    > 60  


 


Random Glucose    56 L  ()  mg/dL


 


Calcium    8.0 L  (8.4-10.5)  mg/dL


 


Total Bilirubin    0.6  (0.2-1.3)  mg/dL


 


AST    116 H D  (14-36)  U/L


 


ALT    47  (7-56)  U/L


 


Alkaline Phosphatase    400 H  ()  U/L


 


Total Protein    5.7 L  (5.8-8.3)  g/dL


 


Albumin    2.8 L  (3.0-4.8)  g/dL


 


Globulin    2.9  gm/dL


 


Albumin/Globulin Ratio    1.0 L  (1.1-1.8)  


 


Blood Type     


 


Antibody Screen     


 


Crossmatch     


 


BBK History Checked     














  03/15/18 03/15/18 03/14/18 Range/Units





  05:50 05:50 21:28 


 


WBC   14.5 H D   (4.5-11.0)  10^3/ul


 


RBC   3.50   (3.5-6.1)  10^6/uL


 


Hgb   11.7 L   (12.0-16.0)  g/dL


 


Hct   35.8 L   (36.0-48.0)  %


 


MCV   102.3   (80.0-105.0)  fl


 


MCH   33.4   (25.0-35.0)  pg


 


MCHC   32.7   (31.0-37.0)  g/dl


 


RDW   24.1 H   (11.5-14.5)  %


 


Plt Count   243   (120.0-450.0)  10^3/uL


 


MPV   11.3 H   (7.0-11.0)  fl


 


Gran %   80.5 H   (50.0-68.0)  %


 


Lymph % (Auto)   6.5 L   (22.0-35.0)  %


 


Mono % (Auto)   12.0 H   (1.0-6.0)  %


 


Eos % (Auto)   0.6 L   (1.5-5.0)  %


 


Baso % (Auto)   0.4   (0.0-3.0)  %


 


Gran #   11.71 H   (1.4-6.5)  


 


Lymph # (Auto)   0.9 L   (1.2-3.4)  


 


Mono # (Auto)   1.7 H   (0.1-0.6)  


 


Eos # (Auto)   0.1   (0.0-0.7)  


 


Baso # (Auto)   0.06   (0.0-2.0)  K/mm3


 


PT  11.2    (9.4-12.5)  SECONDS


 


INR  0.97    (0.93-1.08)  


 


APTT     (25.1-36.5)  Seconds


 


pCO2     (35-45)  mm/Hg


 


pO2     ()  mm/Hg


 


HCO3     (21-28)  mmol/L


 


ABG pH     (7.35-7.45)  


 


ABG Total CO2     (22-28)  mmol.L


 


ABG O2 Saturation     (95-98)  %


 


ABG O2 Content     (15-23)  ML/dl


 


ABG Base Excess     (-2.0-3.0)  mmol/L


 


ABG Hemoglobin     (11.7-17.4)  g/dL


 


ABG Carboxyhemoglobin     (0.5-1.5)  %


 


POC ABG HHb (Measured)     (0-5)  %


 


ABG Methemoglobin     (0.0-3.0)  %


 


ABG O2 Capacity     (16-24)  mL/dl


 


Hgb O2 Saturation     (95.0-98.0)  %


 


FiO2     %


 


Sodium    145  (132-148)  mmol/L


 


Potassium    3.6  (3.6-5.0)  mmol/L


 


Chloride    116 H  ()  mmol/L


 


Carbon Dioxide    18 L  (21-33)  mmol/L


 


Anion Gap    14  (10-20)  


 


BUN    16  (7-21)  mg/dL


 


Creatinine    0.6 L  (0.7-1.2)  mg/dl


 


Est GFR (African Amer)    > 60  


 


Est GFR (Non-Af Amer)    > 60  


 


Random Glucose    86  ()  mg/dL


 


Calcium    8.0 L  (8.4-10.5)  mg/dL


 


Total Bilirubin     (0.2-1.3)  mg/dL


 


AST     (14-36)  U/L


 


ALT     (7-56)  U/L


 


Alkaline Phosphatase     ()  U/L


 


Total Protein     (5.8-8.3)  g/dL


 


Albumin     (3.0-4.8)  g/dL


 


Globulin     gm/dL


 


Albumin/Globulin Ratio     (1.1-1.8)  


 


Blood Type     


 


Antibody Screen     


 


Crossmatch     


 


BBK History Checked     














  03/14/18 03/14/18 03/14/18 Range/Units





  21:28 21:20 05:30 


 


WBC  11.9 H    (4.5-11.0)  10^3/ul


 


RBC  3.48 L    (3.5-6.1)  10^6/uL


 


Hgb  11.7 L D    (12.0-16.0)  g/dL


 


Hct  35.9 L    (36.0-48.0)  %


 


MCV  103.2  D    (80.0-105.0)  fl


 


MCH  33.6    (25.0-35.0)  pg


 


MCHC  32.6    (31.0-37.0)  g/dl


 


RDW  23.6 H    (11.5-14.5)  %


 


Plt Count  227    (120.0-450.0)  10^3/uL


 


MPV  10.8    (7.0-11.0)  fl


 


Gran %  80.0 H    (50.0-68.0)  %


 


Lymph % (Auto)  8.3 L    (22.0-35.0)  %


 


Mono % (Auto)  11.3 H    (1.0-6.0)  %


 


Eos % (Auto)  0.1 L    (1.5-5.0)  %


 


Baso % (Auto)  0.3    (0.0-3.0)  %


 


Gran #  9.55 H    (1.4-6.5)  


 


Lymph # (Auto)  1.0 L    (1.2-3.4)  


 


Mono # (Auto)  1.4 H    (0.1-0.6)  


 


Eos # (Auto)  0.0    (0.0-0.7)  


 


Baso # (Auto)  0.04    (0.0-2.0)  K/mm3


 


PT     (9.4-12.5)  SECONDS


 


INR     (0.93-1.08)  


 


APTT     (25.1-36.5)  Seconds


 


pCO2   32 L   (35-45)  mm/Hg


 


pO2   56.0 L   ()  mm/Hg


 


HCO3   16.9 L   (21-28)  mmol/L


 


ABG pH   7.33 L   (7.35-7.45)  


 


ABG Total CO2   17.9 L   (22-28)  mmol.L


 


ABG O2 Saturation   91.6 L   (95-98)  %


 


ABG O2 Content   14.2 L   (15-23)  ML/dl


 


ABG Base Excess   -8.1 L   (-2.0-3.0)  mmol/L


 


ABG Hemoglobin   11.3 L   (11.7-17.4)  g/dL


 


ABG Carboxyhemoglobin   1.9 H   (0.5-1.5)  %


 


POC ABG HHb (Measured)   8.2 H   (0-5)  %


 


ABG Methemoglobin   0.8   (0.0-3.0)  %


 


ABG O2 Capacity   15.5 L   (16-24)  mL/dl


 


Hgb O2 Saturation   89.1 L   (95.0-98.0)  %


 


FiO2   100.0   %


 


Sodium     (132-148)  mmol/L


 


Potassium     (3.6-5.0)  mmol/L


 


Chloride     ()  mmol/L


 


Carbon Dioxide     (21-33)  mmol/L


 


Anion Gap     (10-20)  


 


BUN     (7-21)  mg/dL


 


Creatinine     (0.7-1.2)  mg/dl


 


Est GFR (African Amer)     


 


Est GFR (Non-Af Amer)     


 


Random Glucose     ()  mg/dL


 


Calcium     (8.4-10.5)  mg/dL


 


Total Bilirubin     (0.2-1.3)  mg/dL


 


AST     (14-36)  U/L


 


ALT     (7-56)  U/L


 


Alkaline Phosphatase     ()  U/L


 


Total Protein     (5.8-8.3)  g/dL


 


Albumin     (3.0-4.8)  g/dL


 


Globulin     gm/dL


 


Albumin/Globulin Ratio     (1.1-1.8)  


 


Blood Type    O POSITIVE  


 


Antibody Screen    Negative  


 


Crossmatch    See Detail  


 


BBK History Checked    Patient has bt  








 Laboratory Results - last 24 hr











  03/14/18 03/14/18 03/14/18





  05:30 21:20 21:28


 


WBC    11.9 H


 


RBC    3.48 L


 


Hgb    11.7 L D


 


Hct    35.9 L


 


MCV    103.2  D


 


MCH    33.6


 


MCHC    32.6


 


RDW    23.6 H


 


Plt Count    227


 


MPV    10.8


 


Gran %    80.0 H


 


Lymph % (Auto)    8.3 L


 


Mono % (Auto)    11.3 H


 


Eos % (Auto)    0.1 L


 


Baso % (Auto)    0.3


 


Gran #    9.55 H


 


Lymph # (Auto)    1.0 L


 


Mono # (Auto)    1.4 H


 


Eos # (Auto)    0.0


 


Baso # (Auto)    0.04


 


PT   


 


INR   


 


APTT   


 


pCO2   32 L 


 


pO2   56.0 L 


 


HCO3   16.9 L 


 


ABG pH   7.33 L 


 


ABG Total CO2   17.9 L 


 


ABG O2 Saturation   91.6 L 


 


ABG O2 Content   14.2 L 


 


ABG Base Excess   -8.1 L 


 


ABG Hemoglobin   11.3 L 


 


ABG Carboxyhemoglobin   1.9 H 


 


POC ABG HHb (Measured)   8.2 H 


 


ABG Methemoglobin   0.8 


 


ABG O2 Capacity   15.5 L 


 


Hgb O2 Saturation   89.1 L 


 


FiO2   100.0 


 


Sodium   


 


Potassium   


 


Chloride   


 


Carbon Dioxide   


 


Anion Gap   


 


BUN   


 


Creatinine   


 


Est GFR ( Amer)   


 


Est GFR (Non-Af Amer)   


 


Random Glucose   


 


Calcium   


 


Total Bilirubin   


 


AST   


 


ALT   


 


Alkaline Phosphatase   


 


Total Protein   


 


Albumin   


 


Globulin   


 


Albumin/Globulin Ratio   


 


Blood Type  O POSITIVE  


 


Antibody Screen  Negative  


 


Crossmatch  See Detail  


 


BBK History Checked  Patient has bt  














  03/14/18 03/15/18 03/15/18





  21:28 05:50 05:50


 


WBC   14.5 H D 


 


RBC   3.50 


 


Hgb   11.7 L 


 


Hct   35.8 L 


 


MCV   102.3 


 


MCH   33.4 


 


MCHC   32.7 


 


RDW   24.1 H 


 


Plt Count   243 


 


MPV   11.3 H 


 


Gran %   80.5 H 


 


Lymph % (Auto)   6.5 L 


 


Mono % (Auto)   12.0 H 


 


Eos % (Auto)   0.6 L 


 


Baso % (Auto)   0.4 


 


Gran #   11.71 H 


 


Lymph # (Auto)   0.9 L 


 


Mono # (Auto)   1.7 H 


 


Eos # (Auto)   0.1 


 


Baso # (Auto)   0.06 


 


PT    11.2


 


INR    0.97


 


APTT   


 


pCO2   


 


pO2   


 


HCO3   


 


ABG pH   


 


ABG Total CO2   


 


ABG O2 Saturation   


 


ABG O2 Content   


 


ABG Base Excess   


 


ABG Hemoglobin   


 


ABG Carboxyhemoglobin   


 


POC ABG HHb (Measured)   


 


ABG Methemoglobin   


 


ABG O2 Capacity   


 


Hgb O2 Saturation   


 


FiO2   


 


Sodium  145  


 


Potassium  3.6  


 


Chloride  116 H  


 


Carbon Dioxide  18 L  


 


Anion Gap  14  


 


BUN  16  


 


Creatinine  0.6 L  


 


Est GFR ( Amer)  > 60  


 


Est GFR (Non-Af Amer)  > 60  


 


Random Glucose  86  


 


Calcium  8.0 L  


 


Total Bilirubin   


 


AST   


 


ALT   


 


Alkaline Phosphatase   


 


Total Protein   


 


Albumin   


 


Globulin   


 


Albumin/Globulin Ratio   


 


Blood Type   


 


Antibody Screen   


 


Crossmatch   


 


BBK History Checked   














  03/15/18 03/15/18 03/15/18





  05:50 06:30 07:50


 


WBC   


 


RBC   


 


Hgb   


 


Hct   


 


MCV   


 


MCH   


 


MCHC   


 


RDW   


 


Plt Count   


 


MPV   


 


Gran %   


 


Lymph % (Auto)   


 


Mono % (Auto)   


 


Eos % (Auto)   


 


Baso % (Auto)   


 


Gran #   


 


Lymph # (Auto)   


 


Mono # (Auto)   


 


Eos # (Auto)   


 


Baso # (Auto)   


 


PT   


 


INR   


 


APTT    > 400.0 H*


 


pCO2   29 L 


 


pO2   81.0 


 


HCO3   18.0 L 


 


ABG pH   7.40 


 


ABG Total CO2   18.9 L 


 


ABG O2 Saturation   97.9 


 


ABG O2 Content   15.1 


 


ABG Base Excess   -5.7 L 


 


ABG Hemoglobin   11.2 L 


 


ABG Carboxyhemoglobin   1.6 H 


 


POC ABG HHb (Measured)   2.0 


 


ABG Methemoglobin   1.2 


 


ABG O2 Capacity   15.4 L 


 


Hgb O2 Saturation   95.3 


 


FiO2   100.0 


 


Sodium  142  


 


Potassium  3.1 L  


 


Chloride  109 H  


 


Carbon Dioxide  21  


 


Anion Gap  16  


 


BUN  14  


 


Creatinine  0.5 L  


 


Est GFR ( Amer)  > 60  


 


Est GFR (Non-Af Amer)  > 60  


 


Random Glucose  56 L  


 


Calcium  8.0 L  


 


Total Bilirubin  0.6  


 


AST  116 H D  


 


ALT  47  


 


Alkaline Phosphatase  400 H  


 


Total Protein  5.7 L  


 


Albumin  2.8 L  


 


Globulin  2.9  


 


Albumin/Globulin Ratio  1.0 L  


 


Blood Type   


 


Antibody Screen   


 


Crossmatch   


 


BBK History Checked   














Critical Care Progress Note





- Nutrition


Nutrition: 


 Nutrition











 Category Date Time Status


 


 Heart Healthy Diet [DIET] Diets  03/14/18 Dinner Ordered


 


 NPO Diet [DIET] Diets  03/15/18 Dinner Ordered














Assessment/Plan





- Assessment and Plan (Free Text)


Plan: 


Patient seen and examined on rounds with resident, agree with note with 

following additions/exceptions:


Patient is 78yo female with PMHx of PVD, mesenteric ischemia, s/p SMA stent, w/ 

history of ischemic colitis s/p R hemicoelctomy s/p ileostomy w/ reversal, CAD  

presents with right sided pulmonary embolism s/p right hip ORIF. Currently 

afebrile, HD stable, comfortable on 4LNC, sat 95%, on heparin drip, no major 

complaints. CT chest with PE and LLL consolidation, on antibiotics. 





PNA


PE


s/p ORIF





Recommend:


- supp o2 as needed, goal sat>90%


- panculture, UCx, BCx, Procal


- BP control


- IVF hydration


- Pain control


- Heparin drip


- Antibiotics, Rocephin, Azithro


- FS control


- GI ppx


- DVT 


- Stable, transfer to telemetry

## 2018-03-15 NOTE — PN
today

DATE:  03/15/2018



POSTOP REPORT



She is 1-day postop from a right hip fracture.  We did a peritroch linda on

the right hip yesterday on 03/14/2018 and soon after surgery, she developed

hypoxia.  We had her transferred to ICU where it was found that she had a

few pulmonary embolisms.  This morning, she looks very good and active and

hungry.  She wants to eat.  Her pain is much less today_, we did the surgery 
soon ss pssible.

 and now the medical service  we will treat her actively for the pulmonary 
embolus and

once she is stable, bring her to the regular floor for more physical

therapy and then, she hoefully to the_ subacute rehab.  I will watch the wound 
even closer.

because of a therapeutic anticoagulation is going to be given.





__________________________________________

Orlando Slade DO







DD:  03/15/2018 8:39:54

DT:  03/15/2018 8:54:17

Job # [id number]







[email or web address]

## 2018-03-15 NOTE — RAD
HISTORY:

post op, low sat  



COMPARISON:

3/13/2018 



FINDINGS:



LUNGS:

New bilateral ill-defined alveolar opacity.  Nonspecific.  Possible 

pneumonia. Possible pulmonary edema. No focal consolidation.



PLEURA:

No significant pleural effusion identified, no pneumothorax apparent.



CARDIOVASCULAR:

Normal.



OSSEOUS STRUCTURES:

No significant abnormalities.



VISUALIZED UPPER ABDOMEN:

Normal.



OTHER FINDINGS:

None.



IMPRESSION:

New bilateral ill-defined alveolar opacity. Rule out pneumonia.  Rule 

out pulmonary edema.  Follow-up advised.

## 2018-03-15 NOTE — CT
EXAM:

  CT Angiography Chest With Intravenous Contrast



CLINICAL HISTORY:

  77 years old, female; Signs and symptoms; Shortness of breath and other: 

Hypoxia; Additional info: SOB and hypoxia



TECHNIQUE:

  Axial computed tomographic angiography images of the chest with intravenous 

contrast using pulmonary embolism protocol.  All CT scans at this facility use 

one or more dose reduction techniques, viz.: automated exposure control; ma/kV 

adjustment per patient size (including targeted exams where dose is matched to 

indication; i.e. head); or iterative reconstruction technique.

  MIP reconstructed images were created and reviewed.

  Coronal and sagittal reformatted images were created and reviewed.



CONTRAST:

  93 mL of [id number] administered intravenously.



COMPARISON:

  CT - ANGIO CHEST PE PROTOCOL 2016-11-13 05:53



FINDINGS:

  Pulmonary arteries:  There are filling defects in the right main, upper, and 

lower lobe pulmonary arteries consistent with pulmonary emboli.

  Aorta:  No acute findings.  No thoracic aortic aneurysm.

  Lungs:  Extensive dense airspace consolidation is present in the left lung 

most severe in the lower lobe.  Right lower lobe dependent consolidation 

secondary to atelectasis or infiltrate.

  Pleural space:  Small bilateral pleural effusions are present.  No 

pneumothorax.

  Heart: Coronary artery calcification. No cardiomegaly.  No significant 

pericardial effusion.  No evidence of RV dysfunction.

  Bones/joints:  No acute fracture.  No dislocation.

  Soft tissues:  Unremarkable.

  Lymph nodes: Multiple mildly prominent mediastinal lymph nodes measuring up 

to 10 mm. 

  Liver:  There is a diffuse decrease in hepatic parenchymal density, 

consistent with fatty infiltration.



IMPRESSION:     

Right pulmonary emboli.



Extensive left pulmonary airspace consolidation consistent with pneumonia.



Right lung base dependent consolidation secondary to atelectasis or infiltrate.



Small bilateral pleural effusions.



Fatty liver.



THIS REPORT CONTAINS FINDINGS THAT MAY BE CRITICAL TO PATIENT CARE.  The 

findings were verbally communicaated via telephone conference with Dr. Beltran at 

12:10 AM EDT on 3/15/2018.  The findings were acknowledged and understood.

## 2018-03-15 NOTE — CON
DATE:  2018



REQUESTING PHYSICIAN:  Dr. Edwards.



REASON FOR CONSULTATION:  Preoperative cardiac evaluation.



HISTORY OF PRESENT ILLNESS:  This is a 77-year-old woman known to us from

prior admissions, with a history of coronary artery disease and diffuse

atherosclerotic disease, who was admitted after a fall with resultant right

hip fracture.  She is tentatively scheduled for a surgical repair later

today.  She denies any recent chest pain.  She states that she lost her

balance with a resultant fall.  She denied any loss of consciousness or

palpitations.



PAST MEDICAL HISTORY:  Her past history is fairly complex in that she has

known coronary artery disease status post prior myocardial infarction.  She

has also had mesenteric ischemia and occluded distal abdominal aorta.  She

has undergone prior stenting of her superior mesenteric artery, has a

history of ischemic colitis and required a right hemicolectomy in the past

with eventual reversal.  She also has moderate mitral regurgitation and

mild LV dysfunction.  She has had a prior TIA as well.



CURRENT MEDICATIONS:  Include Lipitor, metoprolol 12.5 mg b.i.d., Protonix,

morphine, and Ultram.



SOCIAL HISTORY:  She is a former smoker.  She denies alcohol use.



ALLERGIES:  None.



FAMILY HISTORY:  Both parents are  from age-related illness.



REVIEW OF SYSTEMS:  A 10-point review of systems is otherwise unremarkable.



PHYSICAL EXAMINATION:

GENERAL:  She is a chronically-ill appearing elderly woman.

VITAL SIGNS:  Her blood pressure was 126/70 with a pulse of 100,

respirations are 16.  She is afebrile.

HEENT:  Normocephalic, atraumatic.

NECK:  No JVD.

CHEST:  Bilateral scattered rhonchi.

HEART:  PMI displaced laterally with a soft systolic murmur at the apex.

ABDOMEN:  Soft, nontender with normoactive bowel sounds.

EXTREMITIES:  No edema.  Diminished distal pulses.

SKIN:  Warm and dry.



DIAGNOSTIC DATA:  Potassium is 3.6, BUN and creatinine are 12 and 0.5. 

Glucose 111.  Hemoglobin and hematocrit are 7.7 and 24.3 with a platelet

count of 299,000.  White count of 10.6.  PT and INR is 11.2 and 0.97,

albumin is 2.6.



Electrocardiogram reveals sinus tachycardia with ST-T changes, possible

anterolateral ischemia cannot be excluded.  LVH is present.  Chest x-ray

reveals normal cardiac silhouette with clear lung fields.



IMPRESSION:

1.  Mechanical fall with resultant hip fracture, in need of surgical

repair.

2.  Coronary artery disease status post remote myocardial infarction, no

evidence of active ischemia recently.

3.  Moderate mitral regurgitation.

4.  Significant peripheral vascular disease with prior intestinal ischemia

requiring superior mesenteric artery stenting and hemicolectomy.

5.  Rest of the problems as noted.

6.  Severe anemia.



RECOMMENDATIONS:  At this time, transfusion of 2 units of packed cells

prior to proceeding with surgical repair of her hip would be appropriate in

order to optimize her condition.  Blood transfusion should not be given too

briskly given her valvular heart disease and potential for decompensating

congestive heart failure.  In addition, intraoperatively, avoidance of

excessive volume infusion would be appropriate.  She appears to be at

moderately increased risk for perioperative cardiac events given her

history and general condition; however, after transfusion, she would likely

be adequately optimized to proceed with acceptable risk.



Thanks for this consultation.  We are happy to follow her through hospital

course and make further recommendations as appropriate.





__________________________________________

Ochoa Moe MD





DD:  2018 22:38:29

DT:  03/15/2018 1:49:10

[facility] # [id number]

## 2018-03-16 LAB
ALBUMIN SERPL-MCNC: 2.2 G/DL (ref 3–4.8)
ALBUMIN/GLOB SERPL: 0.9 {RATIO} (ref 1.1–1.8)
ALT SERPL-CCNC: 45 U/L (ref 7–56)
ANISOCYTOSIS BLD QL SMEAR: SLIGHT
AST SERPL-CCNC: 92 U/L (ref 14–36)
BASOPHILS # BLD AUTO: 0.02 K/MM3 (ref 0–2)
BASOPHILS # BLD AUTO: 0.02 K/MM3 (ref 0–2)
BASOPHILS NFR BLD: 0.2 % (ref 0–3)
BASOPHILS NFR BLD: 0.2 % (ref 0–3)
BUN SERPL-MCNC: 20 MG/DL (ref 7–21)
CALCIUM SERPL-MCNC: 8 MG/DL (ref 8.4–10.5)
EOSINOPHIL # BLD: 0.1 10*3/UL (ref 0–0.7)
EOSINOPHIL # BLD: 0.1 10*3/UL (ref 0–0.7)
EOSINOPHIL NFR BLD: 0.9 % (ref 1.5–5)
EOSINOPHIL NFR BLD: 1 % (ref 0–3)
EOSINOPHIL NFR BLD: 1.2 % (ref 1.5–5)
ERYTHROCYTE [DISTWIDTH] IN BLOOD BY AUTOMATED COUNT: 21.8 % (ref 11.5–14.5)
ERYTHROCYTE [DISTWIDTH] IN BLOOD BY AUTOMATED COUNT: 22.3 % (ref 11.5–14.5)
GFR NON-AFRICAN AMERICAN: > 60
GRANULOCYTES # BLD: 8.57 10*3/UL (ref 1.4–6.5)
GRANULOCYTES # BLD: 8.61 10*3/UL (ref 1.4–6.5)
GRANULOCYTES NFR BLD: 76.5 % (ref 50–68)
GRANULOCYTES NFR BLD: 77.9 % (ref 50–68)
HGB BLD-MCNC: 9 G/DL (ref 12–16)
HGB BLD-MCNC: 9.1 G/DL (ref 12–16)
HYPOCHROMIA BLD QL SMEAR: SLIGHT
INR PPP: 1.77 (ref 0.93–1.08)
LYMPHOCYTE: 2 % (ref 22–35)
LYMPHOCYTES # BLD: 0.5 10*3/UL (ref 1.2–3.4)
LYMPHOCYTES # BLD: 0.8 10*3/UL (ref 1.2–3.4)
LYMPHOCYTES NFR BLD AUTO: 4.7 % (ref 22–35)
LYMPHOCYTES NFR BLD AUTO: 7.4 % (ref 22–35)
MACROCYTES BLD QL SMEAR: SLIGHT
MCH RBC QN AUTO: 33.1 PG (ref 25–35)
MCH RBC QN AUTO: 33.3 PG (ref 25–35)
MCHC RBC AUTO-ENTMCNC: 33.3 G/DL (ref 31–37)
MCHC RBC AUTO-ENTMCNC: 33.3 G/DL (ref 31–37)
MCV RBC AUTO: 100 FL (ref 80–105)
MCV RBC AUTO: 99.3 FL (ref 80–105)
MONOCYTE: 6 % (ref 1–6)
MONOCYTES # BLD AUTO: 1.7 10*3/UL (ref 0.1–0.6)
MONOCYTES # BLD AUTO: 1.8 10*3/UL (ref 0.1–0.6)
MONOCYTES NFR BLD: 14.7 % (ref 1–6)
MONOCYTES NFR BLD: 16.3 % (ref 1–6)
NEUTROPHILS NFR BLD AUTO: 91 % (ref 50–70)
PLATELET # BLD EST: NORMAL 10*3/UL
PLATELET # BLD: 203 10^3/UL (ref 120–450)
PLATELET # BLD: 221 10^3/UL (ref 120–450)
PMV BLD AUTO: 10.3 FL (ref 7–11)
PMV BLD AUTO: 11.2 FL (ref 7–11)
POLYCHROMASIA BLD QL SMEAR: SLIGHT
PROTHROMBIN TIME: 20.6 SECONDS (ref 9.4–12.5)
RBC # BLD AUTO: 2.7 10^6/UL (ref 3.5–6.1)
RBC # BLD AUTO: 2.75 10^6/UL (ref 3.5–6.1)
WBC # BLD AUTO: 11 10^3/UL (ref 4.5–11)
WBC # BLD AUTO: 11.3 10^3/UL (ref 4.5–11)

## 2018-03-16 RX ADMIN — MORPHINE SULFATE PRN MG: 2 INJECTION, SOLUTION INTRAMUSCULAR; INTRAVENOUS at 01:02

## 2018-03-16 RX ADMIN — MORPHINE SULFATE PRN MG: 2 INJECTION, SOLUTION INTRAMUSCULAR; INTRAVENOUS at 15:00

## 2018-03-16 RX ADMIN — CEFTRIAXONE SCH MLS/HR: 1 INJECTION, SOLUTION INTRAVENOUS at 09:28

## 2018-03-16 RX ADMIN — MORPHINE SULFATE PRN MG: 2 INJECTION, SOLUTION INTRAMUSCULAR; INTRAVENOUS at 09:48

## 2018-03-16 RX ADMIN — MORPHINE SULFATE PRN MG: 2 INJECTION, SOLUTION INTRAMUSCULAR; INTRAVENOUS at 20:55

## 2018-03-17 LAB
ALBUMIN SERPL-MCNC: 2.4 G/DL (ref 3–4.8)
ALBUMIN/GLOB SERPL: 0.8 {RATIO} (ref 1.1–1.8)
ALT SERPL-CCNC: 52 U/L (ref 7–56)
APTT BLD: 40.1 SECONDS (ref 25.1–36.5)
AST SERPL-CCNC: 120 U/L (ref 14–36)
BASOPHILS # BLD AUTO: 0.02 K/MM3 (ref 0–2)
BASOPHILS NFR BLD: 0.2 % (ref 0–3)
BUN SERPL-MCNC: 20 MG/DL (ref 7–21)
CALCIUM SERPL-MCNC: 8.6 MG/DL (ref 8.4–10.5)
EOSINOPHIL # BLD: 0.2 10*3/UL (ref 0–0.7)
EOSINOPHIL NFR BLD: 1.6 % (ref 1.5–5)
ERYTHROCYTE [DISTWIDTH] IN BLOOD BY AUTOMATED COUNT: 21.1 % (ref 11.5–14.5)
GFR NON-AFRICAN AMERICAN: > 60
GRANULOCYTES # BLD: 8 10*3/UL (ref 1.4–6.5)
GRANULOCYTES NFR BLD: 76.3 % (ref 50–68)
HGB BLD-MCNC: 9.2 G/DL (ref 12–16)
INR PPP: 6.05 (ref 0.93–1.08)
LYMPHOCYTES # BLD: 0.7 10*3/UL (ref 1.2–3.4)
LYMPHOCYTES NFR BLD AUTO: 6.4 % (ref 22–35)
MCH RBC QN AUTO: 32.9 PG (ref 25–35)
MCHC RBC AUTO-ENTMCNC: 32.4 G/DL (ref 31–37)
MCV RBC AUTO: 101.4 FL (ref 80–105)
MONOCYTES # BLD AUTO: 1.6 10*3/UL (ref 0.1–0.6)
MONOCYTES NFR BLD: 15.5 % (ref 1–6)
PLATELET # BLD: 248 10^3/UL (ref 120–450)
PMV BLD AUTO: 11.5 FL (ref 7–11)
PROTHROMBIN TIME: 72.2 SECONDS (ref 9.4–12.5)
RBC # BLD AUTO: 2.8 10^6/UL (ref 3.5–6.1)
WBC # BLD AUTO: 10.5 10^3/UL (ref 4.5–11)

## 2018-03-17 RX ADMIN — MORPHINE SULFATE PRN MG: 2 INJECTION, SOLUTION INTRAMUSCULAR; INTRAVENOUS at 18:39

## 2018-03-17 RX ADMIN — MORPHINE SULFATE PRN MG: 2 INJECTION, SOLUTION INTRAMUSCULAR; INTRAVENOUS at 04:54

## 2018-03-17 RX ADMIN — CEFTRIAXONE SCH MLS/HR: 1 INJECTION, SOLUTION INTRAVENOUS at 09:10

## 2018-03-17 RX ADMIN — MORPHINE SULFATE PRN MG: 2 INJECTION, SOLUTION INTRAMUSCULAR; INTRAVENOUS at 11:29

## 2018-03-17 RX ADMIN — MORPHINE SULFATE PRN MG: 2 INJECTION, SOLUTION INTRAMUSCULAR; INTRAVENOUS at 22:17

## 2018-03-17 RX ADMIN — PANTOPRAZOLE SODIUM SCH MG: 40 TABLET, DELAYED RELEASE ORAL at 07:45

## 2018-03-17 NOTE — PN
SUBJECTIVE:  

The patient was seen and examined at bedside in the CCU.  No acute events 
overnight.  She remains afebrile, hemodynamically stable. This morning she 
states she feels okay and is frustrated that she remains in the hospital and 
overall offers no complaints.



OBJECTIVE

VITAL SIGNS:  Temperature 98.1, pulse 93, blood pressure 124/80, respiratory 
rate 20.  Oxygen saturation 93% on room air.

GENERAL:  Frail, elderly, chronically ill-appearing woman, lying in bed in no 
apparent distress.

HEENT:  PERRL.  EOMI.  No scleral icterus.  Mild conjunctival pallor is noted.  
Poor dentition is noted.

NECK:  No JVD.

LUNGS:  Decreased breath sounds at the bases with crackles to the left lower 
lobe.

CARDIOVASCULAR:  Regular rate and rhythm.  Normal S1 and S2.

ABDOMEN:  Normoactive bowel sounds.  Soft, nontender, nondistended.

EXTREMITIES:  No edema.  Surgical site appears clean, dry and intact.

NEUROLOGIC:  Awake, alert, and oriented x3.  No focal motor deficits.

SKIN:  Dry with poor turgor and multiple areas of ecchymoses to her bilateral 
upper extremities.



LABORATORY DATA:  

WBC 10.5 with 76% neutrophils, hemoglobin 9, hematocrit 28, and platelets 248,
000.  

Chemistry pending.  INR 6.05.



ASSESSMENT:  

The patient is a 77 year old woman with a past medical history of CAD s/p NSTEMI
, severe PVD, history of TIA, history of mesenteric ischemia s/p right 
hemicolectomy s/p ileostomy with reversal who presented s/p mechanical fall 
with the resultant right femoral fracture who is now s/p ORIF POD #3 and whose 
hospital course was complicated by development of right pulmonary embolism and 
left lower lobe pneumonia.



PLAN

1.  Acute closed fracture of the right femur s/p ORIF POD #3.  Continue with 
postsurgical care as per Dr. Slade.  PT evaluation is pending.  
Analgesics as per Dr. Slade.  Continue bowel regimen.

2.  Pulmonary embolism, acute, right sided.  Patient remains on heparin drip 
and is being bridged with Coumadin.  INR noted to be supratherapeutic at 6.05.  
We will stop Coumadin and continue to monitor INR daily and resume at lower 
dose when INR in therapeutic range of 2-3.  She will require approximately 3-6 
months of anticoagulation therapy given that this was a provoked PE.  Continue 
with supplemental oxygen as needed and close pulse oximetry monitoring.

3.  Left lower lobe pneumonia.  Patient remains afebrile and with improving 
leukocytosis.  Continue Ceftriaxone 1 g IV daily and Azithromycin 500 mg p.o. 
daily.

4.  CAD s/p NSTEMI.  Continue Lipitor 80 mg p.o. daily.

5.  History of TIA.  Continue Lipitor 80 mg p.o. daily.

6.  Severe PVD.  Continue Lipitor 80 mg p.o. daily.

7.  Anemia of chronic disease.  The patient has been transfused 4 units since 
admission and labs demonstrate stable H/H.  We will continue to monitor CBC 
daily.

8.  Prophylaxis.  Continue with Protonix 40 mg IV daily for GI prophylaxis. The 
patient remains on therapeutic heparin thus DVT prophylaxis is not indicated.





CODE STATUS:  Full code.





__________________________________________

Jose Elias Edwards MD



DD:  03/17/2018 9:01:51

DT:  03/17/2018 10:24:32

Job # [id number]



CEDRIC

## 2018-03-18 LAB
ALBUMIN SERPL-MCNC: 2.4 G/DL (ref 3–4.8)
ALBUMIN/GLOB SERPL: 0.8 {RATIO} (ref 1.1–1.8)
ALT SERPL-CCNC: 57 U/L (ref 7–56)
APTT BLD: 79.8 SECONDS (ref 25.1–36.5)
AST SERPL-CCNC: 141 U/L (ref 14–36)
BASOPHILS # BLD AUTO: 0.02 K/MM3 (ref 0–2)
BASOPHILS NFR BLD: 0.2 % (ref 0–3)
BUN SERPL-MCNC: 17 MG/DL (ref 7–21)
CALCIUM SERPL-MCNC: 8.4 MG/DL (ref 8.4–10.5)
EOSINOPHIL # BLD: 0.3 10*3/UL (ref 0–0.7)
EOSINOPHIL NFR BLD: 3.5 % (ref 1.5–5)
ERYTHROCYTE [DISTWIDTH] IN BLOOD BY AUTOMATED COUNT: 20.5 % (ref 11.5–14.5)
GFR NON-AFRICAN AMERICAN: > 60
GRANULOCYTES # BLD: 7.1 10*3/UL (ref 1.4–6.5)
GRANULOCYTES NFR BLD: 72.5 % (ref 50–68)
HGB BLD-MCNC: 8.7 G/DL (ref 12–16)
INR PPP: 7.46 (ref 0.93–1.08)
LYMPHOCYTES # BLD: 0.6 10*3/UL (ref 1.2–3.4)
LYMPHOCYTES NFR BLD AUTO: 5.6 % (ref 22–35)
MCH RBC QN AUTO: 32.8 PG (ref 25–35)
MCHC RBC AUTO-ENTMCNC: 32.3 G/DL (ref 31–37)
MCV RBC AUTO: 101.5 FL (ref 80–105)
MONOCYTES # BLD AUTO: 1.8 10*3/UL (ref 0.1–0.6)
MONOCYTES NFR BLD: 18.2 % (ref 1–6)
PLATELET # BLD: 262 10^3/UL (ref 120–450)
PMV BLD AUTO: 12 FL (ref 7–11)
PROTHROMBIN TIME: 89.4 SECONDS (ref 9.4–12.5)
RBC # BLD AUTO: 2.65 10^6/UL (ref 3.5–6.1)
WBC # BLD AUTO: 9.8 10^3/UL (ref 4.5–11)

## 2018-03-18 RX ADMIN — MORPHINE SULFATE PRN MG: 2 INJECTION, SOLUTION INTRAMUSCULAR; INTRAVENOUS at 18:20

## 2018-03-18 RX ADMIN — MORPHINE SULFATE PRN MG: 2 INJECTION, SOLUTION INTRAMUSCULAR; INTRAVENOUS at 07:24

## 2018-03-18 RX ADMIN — MORPHINE SULFATE PRN MG: 2 INJECTION, SOLUTION INTRAMUSCULAR; INTRAVENOUS at 02:38

## 2018-03-18 RX ADMIN — PANTOPRAZOLE SODIUM SCH MG: 40 TABLET, DELAYED RELEASE ORAL at 11:05

## 2018-03-18 RX ADMIN — CEFTRIAXONE SCH MLS/HR: 1 INJECTION, SOLUTION INTRAVENOUS at 11:45

## 2018-03-18 RX ADMIN — HEPARIN SODIUM PRN MLS/HR: 10000 INJECTION, SOLUTION INTRAVENOUS at 17:36

## 2018-03-18 RX ADMIN — MORPHINE SULFATE PRN MG: 2 INJECTION, SOLUTION INTRAMUSCULAR; INTRAVENOUS at 22:43

## 2018-03-18 NOTE — PN
DATE:



UPDATE REPORT



LOCATION:  129, bed 2, ICU.



SUBJECTIVE:  A 77-year-old female who underwent operative fixation of her

right hip fracture on 03/04/2018.  She is doing well with the hip.  She is

in ICU for medical reasons, but she has a tendency to have pain all over

her body and it is very sensitive to touch as if she has had a mild case of

neuropathy and the fracture is well fixed with an intramedullary linda and I

have to be careful we do not overmedicate her because she is more

uncomfortable that pain from the fracture, where you touch any part of her

body, she is very sensitive, so we have to be careful we do not

overmedicate her with narcotics.  Once she gets medically better, we could

progress with physical therapy and up out of bed.  Otherwise, the wounds

look well in that there are no signs of hematoma because of all the blood

thinner she is getting.





__________________________________________

Orlando Slade DO



DD:  03/18/2018 10:21:52

DT:  03/18/2018 12:06:51

Job # [id number]

## 2018-03-18 NOTE — PN
SUBJECTIVE: 

The patient was seen and examined at the bedside in the CCU.  No acute events 
overnight.  She remains afebrile and hemodynamically stable. This morning she 
states she feels okay but does endorse some pain at her right hip and states it 
is controlled with current analgesic regimen. Otherwise, she offers no 
complaints.



OBJECTIVE

VITAL SIGNS:  Temperature 98.7, pulse 89, blood pressure 113/51, respiratory 
rate 20, and oxygen saturation 98% on 2 liters nasal cannula.

GENERAL:  Frail, elderly, chronically ill-appearing woman, lying in bed in no 
apparent distress.

HEENT:  PERRL.  EOMI.  No scleral icterus.  Mild conjunctival pallor is

noted.  Poor dentition is noted.

NECK:  No JVD.

LUNGS:  Decreased breath sounds at the bases with crackles to the left lower 
lobe.

CARDIOVASCULAR:  Regular rate and rhythm.  Normal S1 and S2.

ABDOMEN:  Normoactive bowel sounds.  Soft, nontender, nondistended.

EXTREMITIES:  No edema.  Surgical site appears clean, dry and intact.

NEUROLOGIC:  Awake, alert, and oriented x3.  No focal motor deficits.

SKIN:  Dry with poor turgor and multiple areas of ecchymoses to her bilateral 
upper extremities.



LABORATORY DATA:  

WBC 9.8 with 72% neutrophils, hemoglobin 8.7, hematocrit 27, and platelets 262,
000.  

Chemistry reviewed and unremarkable.  , ALT 57, alkaline phosphate 539, 
T. bili 0.9.  INR 7.46.



ASSESSMENT:  

The patient is a 77 year old woman with a past medical history of CAD s/p NSTEMI
, severe PVD, history of TIA and history of mesenteric ischemia s/p right 
hemicolectomy s/p ileostomy with reversal who presented s/p mechanical fall 
with resultant right femoral fracture who is now s/p ORIF POD #4 and whose 
hospital course was complicated by development of right pulmonary embolism and 
left lower lobe pneumonia.



PLAN

1.  Acute closed fracture of the right femur s/p ORIF POD #4.  Continue with 
postsurgical care as per Dr. Slade.  PT evaluation is pending.  Continue 
with Morphine 2 mg IV q. 4 hours p.r.n. pain.

2.  Pulmonary embolism, acute, right sided.  Patient remains on heparin drip 
and is being bridged with Coumadin.  INR noted to be supratherapeutic at 7.46. 
  We will administer Vitamin K 5 mg p.o. x1.  Coumadin remains on hold.  We 
will continue to monitor INR daily with goal 2-3.  She will require 6 months of 
anticoagulation therapy.  Continue with supplemental oxygen and pulse oximetry 
monitoring.

3.  Left lower lobe pneumonia.  Patient remains afebrile and with improving 
leukocytosis.  We will change her antibiotics to Levaquin 500 mg p.o. daily to 
complete a 10 day course (today is day #4 of antibiotics).

4.  CAD s/p NSTEMI.  Continue Lipitor 80 mg p.o. daily.

5.  History of TIA.  Continue Lipitor 80 mg p.o. daily.

6.  Severe PVD.  Continue Lipitor 80 mg p.o. daily.

7.  Anemia of chronic disease.  The patient has a baseline Hb of 8-9 and 
appears to be at her baseline.  We will continue to monitor her CBC daily and 
transfuse as needed.

8.  Prophylaxis.  Continue Protonix 40 mg IV daily for GI prophylaxis.  The 
patient remains on therapeutic heparin thus DVT prophylaxis is not indicated.





CODE STATUS:  Full code.





__________________________________________

Jose Elias Edwards MD



DD:  03/18/2018 11:00:26

DT:  03/18/2018 13:12:46

Job # [id number]





[email or web address]

## 2018-03-19 VITALS — OXYGEN SATURATION: 99 % | RESPIRATION RATE: 45 BRPM

## 2018-03-19 VITALS — TEMPERATURE: 99.3 F

## 2018-03-19 LAB
ALBUMIN SERPL-MCNC: 2.3 G/DL (ref 3–4.8)
ALBUMIN/GLOB SERPL: 0.8 {RATIO} (ref 1.1–1.8)
ALT SERPL-CCNC: 50 U/L (ref 7–56)
APTT BLD: 46 SECONDS (ref 25.1–36.5)
AST SERPL-CCNC: 107 U/L (ref 14–36)
BASOPHILS # BLD AUTO: 0.05 K/MM3 (ref 0–2)
BASOPHILS NFR BLD: 0.5 % (ref 0–3)
BUN SERPL-MCNC: 16 MG/DL (ref 7–21)
CALCIUM SERPL-MCNC: 8.5 MG/DL (ref 8.4–10.5)
EOSINOPHIL # BLD: 0.2 10*3/UL (ref 0–0.7)
EOSINOPHIL NFR BLD: 2.3 % (ref 1.5–5)
ERYTHROCYTE [DISTWIDTH] IN BLOOD BY AUTOMATED COUNT: 19.6 % (ref 11.5–14.5)
GFR NON-AFRICAN AMERICAN: > 60
GRANULOCYTES # BLD: 6.69 10*3/UL (ref 1.4–6.5)
GRANULOCYTES NFR BLD: 67.9 % (ref 50–68)
HGB BLD-MCNC: 8.5 G/DL (ref 12–16)
INR PPP: 2.09 (ref 0.93–1.08)
LYMPHOCYTES # BLD: 0.5 10*3/UL (ref 1.2–3.4)
LYMPHOCYTES NFR BLD AUTO: 5.3 % (ref 22–35)
MCH RBC QN AUTO: 32.8 PG (ref 25–35)
MCHC RBC AUTO-ENTMCNC: 32.4 G/DL (ref 31–37)
MCV RBC AUTO: 101.2 FL (ref 80–105)
MONOCYTES # BLD AUTO: 2.4 10*3/UL (ref 0.1–0.6)
MONOCYTES NFR BLD: 24 % (ref 1–6)
PLATELET # BLD: 305 10^3/UL (ref 120–450)
PMV BLD AUTO: 11.9 FL (ref 7–11)
PROTHROMBIN TIME: 24.3 SECONDS (ref 9.4–12.5)
RBC # BLD AUTO: 2.59 10^6/UL (ref 3.5–6.1)
WBC # BLD AUTO: 9.9 10^3/UL (ref 4.5–11)

## 2018-03-19 RX ADMIN — MORPHINE SULFATE PRN MG: 2 INJECTION, SOLUTION INTRAMUSCULAR; INTRAVENOUS at 04:52

## 2018-03-19 RX ADMIN — PANTOPRAZOLE SODIUM SCH MG: 40 TABLET, DELAYED RELEASE ORAL at 09:27

## 2018-03-19 RX ADMIN — MORPHINE SULFATE PRN MG: 2 INJECTION, SOLUTION INTRAMUSCULAR; INTRAVENOUS at 11:14

## 2018-03-19 NOTE — PN
SUBJECTIVE:  

The patient was seen and examined at the bedside in the CCU.  No acute events 
overnight.  She remains afebrile and hemodynamically stable. This morning she 
feels okay and offers no complaints.



PHYSICAL EXAMINATION:

VITAL SIGNS:  Temperature 98.4, pulse 84, blood pressure 116/51, respiratory 
rate 18, and oxygen saturation 98% on 2 liters nasal cannula.

GENERAL:  Frail, elderly, chronically ill-appearing woman, lying in bed in no 
apparent distress.

HEENT:  PERRL.  EOMI.  No scleral icterus.  Mild conjunctival pallor is noted.  
Poor dentition is noted.

NECK:  No JVD.

LUNGS:  Decreased breath sounds at the bases with crackles to the left lower 
lobe.

CARDIOVASCULAR:  Regular rate and rhythm.  Normal S1 and S2.

ABDOMEN:  Normoactive bowel sounds.  Soft, nontender, nondistended.

EXTREMITIES:  No edema.  Surgical site appears clean, dry and intact.

NEUROLOGIC:  Awake, alert, and oriented x3.  No focal motor deficits.

SKIN:  Multiple areas of ecchymoses to her bilateral upper extremities.



LABORATORY DATA:  

WBC 9.9, hemoglobin 8.5, hematocrit 26, platelets 305. 

Chemistry reviewed and unremarkable.  

INR 2.09.



ASSESSMENT:  

The patient is a 77 year old woman with a past medical history of CAD s/p NSTEMI
, severe PVD, history of TIA and history of mesenteric ischemia s/p right 
hemicolectomy s/p ileostomy with reversal who presented s/p mechanical fall 
with resultant right femoral fracture who is now s/p ORIF POD #5 and whose 
hospital course was complicated by development of right pulmonary embolism and 
left lower lobe pneumonia.



PLAN:

1.  Acute closed fracture of the right femur s/p ORIF POD #5.  Continue with 
postsurgical care as per Dr. Slade.  PT evaluation is pending.  Continue 
morphine 2 mg IV q. 4 hours p.r.n. pain.

2.  Pulmonary embolism, acute, right-sided.  The patient remains on heparin 
drip and is being bridged with Coumadin.  INR in therapeutic range at 2.09. We 
will resume Coumadin 4 mg p.o. daily with goal INR of 2-3.  She will require 6 
months of anticoagulation therapy.  Continue with supplemental oxygen and pulse 
oximetry monitoring.

3.  Left lower lobe pneumonia.  Continue Levaquin 500 mg p.o. daily to complete 
a 10-day course (today is day #5 of antibiotics).

4.  CAD s/p NSTEMI.  Continue Lipitor 80 mg p.o. daily.

5.  History of TIA.  Continue Lipitor 80 mg p.o. daily.

6.  Severe PVD.  Continue Lipitor 80 mg p.o. daily.

7.  Anemia of chronic disease.  H/H remains at baseline (Hb 8-9).  We will 
continue to monitor her CBC daily and transfuse as needed.

8.  Prophylaxis.  Continue Protonix for GI prophylaxis.  The patient remains on 
therapeutic heparin thus DVT prophylaxis is not indicated.





CODE STATUS:  Full code.





__________________________________________

Jose Elias Edwards MD





DD:  03/19/2018 8:58:35

DT:  03/19/2018 10:29:05

Job # [id number]



[email or web address]

## 2018-03-20 VITALS — HEART RATE: 73 BPM

## 2018-03-20 VITALS — SYSTOLIC BLOOD PRESSURE: 107 MMHG | DIASTOLIC BLOOD PRESSURE: 43 MMHG

## 2018-03-20 LAB
% IRON SATURATION: 14 % (ref 20–55)
ALBUMIN SERPL-MCNC: 2.3 G/DL (ref 3–4.8)
ALBUMIN/GLOB SERPL: 0.8 {RATIO} (ref 1.1–1.8)
ALT SERPL-CCNC: 57 U/L (ref 7–56)
ANISOCYTOSIS BLD QL SMEAR: (no result)
AST SERPL-CCNC: 105 U/L (ref 14–36)
BASOPHILS # BLD AUTO: 0.06 K/MM3 (ref 0–2)
BASOPHILS NFR BLD: 0.6 % (ref 0–3)
BUN SERPL-MCNC: 14 MG/DL (ref 7–21)
CALCIUM SERPL-MCNC: 8.3 MG/DL (ref 8.4–10.5)
EOSINOPHIL # BLD: 0.2 10*3/UL (ref 0–0.7)
EOSINOPHIL NFR BLD: 2 % (ref 1.5–5)
ERYTHROCYTE [DISTWIDTH] IN BLOOD BY AUTOMATED COUNT: 19.5 % (ref 11.5–14.5)
FERRITIN SERPL-MCNC: 119 NG/ML
FOLATE SERPL-MCNC: 5.8 NG/ML
GFR NON-AFRICAN AMERICAN: > 60
GRANULOCYTES # BLD: 6.08 10*3/UL (ref 1.4–6.5)
GRANULOCYTES NFR BLD: 64.3 % (ref 50–68)
HGB BLD-MCNC: 8.5 G/DL (ref 12–16)
HYPOCHROMIA BLD QL SMEAR: (no result)
INR PPP: 1.75 (ref 0.93–1.08)
IRON SERPL-MCNC: 31 UG/DL (ref 45–180)
LG PLATELETS BLD QL SMEAR: PRESENT
LYMPHOCYTE: 6 % (ref 22–35)
LYMPHOCYTES # BLD: 0.7 10*3/UL (ref 1.2–3.4)
LYMPHOCYTES NFR BLD AUTO: 7.4 % (ref 22–35)
MACROCYTES BLD QL SMEAR: (no result)
MCH RBC QN AUTO: 32.6 PG (ref 25–35)
MCHC RBC AUTO-ENTMCNC: 32.2 G/DL (ref 31–37)
MCV RBC AUTO: 101.1 FL (ref 80–105)
MONOCYTE: 17 % (ref 1–6)
MONOCYTES # BLD AUTO: 2.4 10*3/UL (ref 0.1–0.6)
MONOCYTES NFR BLD: 25.7 % (ref 1–6)
NEUTROPHILS NFR BLD AUTO: 74 % (ref 50–70)
OVALOCYTES BLD QL SMEAR: SLIGHT
PLATELET # BLD EST: NORMAL 10*3/UL
PLATELET # BLD: 354 10^3/UL (ref 120–450)
PMV BLD AUTO: 11.3 FL (ref 7–11)
POLYCHROMASIA BLD QL SMEAR: SLIGHT
PROTHROMBIN TIME: 20.3 SECONDS (ref 9.4–12.5)
RBC # BLD AUTO: 2.61 10^6/UL (ref 3.5–6.1)
TIBC SERPL-MCNC: 221 UG/DL (ref 265–497)
VARIANT LYMPHS NFR BLD MANUAL: 3 % (ref 0–0)
VIT B12 SERPL-MCNC: 300 PG/ML (ref 239–931)
WBC # BLD AUTO: 9.5 10^3/UL (ref 4.5–11)

## 2018-03-20 RX ADMIN — MORPHINE SULFATE PRN MG: 2 INJECTION, SOLUTION INTRAMUSCULAR; INTRAVENOUS at 02:07

## 2018-03-20 RX ADMIN — MORPHINE SULFATE PRN MG: 2 INJECTION, SOLUTION INTRAMUSCULAR; INTRAVENOUS at 07:11

## 2018-03-20 RX ADMIN — PANTOPRAZOLE SODIUM SCH MG: 40 TABLET, DELAYED RELEASE ORAL at 08:21

## 2018-03-20 NOTE — PN
DATE:  03/20/2018



UPDATED REPORT



The patient is postop right hip fracture with biomet affixis im  linda, had 
medical

complication with pulmonary embolism, but doing quite well.  She is still

on therapeutic anticoagulation.  She was up out of bed yesterday and

tolerated that well and she likes to keep the pillow under her right knee,

but I tried to discourage her because she is developing a knee flexion

contracture and if that happens, she will be not able to ambulate well, so

I have discouraged her from bending the knee and put the pillow under the

calf, not the knee.  The wound is dry.  We will remove the sutuures next wk..  
As the

patient is almost a week postop from 03/14/2018, continue medical

service and therapy.i will be away until 3/24/18





__________________________________________

Orlando Slade DO



DD:  03/20/2018 8:03:28

DT:  03/20/2018 8:47:15

Job # [id number]





[email or web address]

## 2018-03-20 NOTE — PN
SUBJECTIVE:  

The patient was seen and examined at the bedside in the CCU.  No acute events 
overnight.  She remains afebrile and hemodynamically stable. This morning, she 
feels okay and offers no complaints.



PHYSICAL EXAMINATION

VITAL SIGNS:  Temperature 98.4, pulse 73, blood pressure 110/66, respiratory 
rate 20, oxygen saturation 98% on 2 liters nasal cannula.

GENERAL:  Frail, elderly, chronically ill-appearing woman, lying in bed in no 
apparent distress.

HEENT:  PERRL.  EOMI.  No scleral icterus.  Mild conjunctival pallor is noted.  
Poor dentition is noted.

NECK:  No JVD.

LUNGS:  Decreased breath sounds at bases with improving crackles to the left 
lower lobe.

CARDIOVASCULAR:  Regular rate and rhythm.  Normal S1 and S2.

ABDOMEN:  Normoactive bowel sounds.  Soft, nontender, nondistended.

EXTREMITIES:  No edema.  Surgical site appears clean, dry and intact.

NEUROLOGIC:  Awake, alert, and oriented x3.  No focal motor deficits.

SKIN:  Multiple areas of ecchymoses to bilateral upper extremities.



LABORATORY DATA:  

WBC 9.5, hemoglobin 8.5, hematocrit 26, platelets 354,000.  

Chemistry reviewed and unremarkable.  INR  1.75.



ASSESSMENT:  

The patient is a 77 year old woman with a past medical history of CAD s/p NSTEMI
, severe PVD, history of TIA and history of mesenteric ischemia s/p right 
hemicolectomy s/p ileostomy with reversal who presented s/p mechanical fall 
with resultant right femoral fracture who is now s/p ORIF POD #6 and whose 
hospital course was complicated by development of right pulmonary embolism and 
left lower lobe pneumonia.



PLAN

1.  Acute closed fracture of the right femur s/p ORIF POD #6.  Continue with 
postsurgical care as per Dr. Slade.  Continue Morphine 2 mg IV q. 4 
hours p.r.n. pain.  PT evaluation appreciated and subacute rehab has been 
recommended.

2.  Pulmonary embolism, acute, right sided.  Continue Coumadin 4 mg p.o. daily 
with goal INR 2-3.  She will require 6 months of anticoagulation therapy.  
Continue supplemental oxygen and pulse oximetry monitoring.

3.  Left lower lobe pneumonia.  Continue Levaquin 500 mg p.o. daily to complete 
a 10 day course (today is day #6 of antibiotics).

4.  CAD s/p NSTEMI.  Continue Lipitor 80 mg p.o. daily.

5.  History of TIA.  Continue Lipitor 80 mg p.o. daily.

6.  Severe PVD.  Continue Lipitor 80 mg p.o. daily.

7.  Anemia of chronic disease.  H/H remains at baseline (Hb 8-9) Continue to 
monitor her CBC daily and transfuse as needed.

8.  Prophylaxis.  Continue Protonix for GI prophylaxis.  The patient remains on 
Coumadin thus DVT prophylaxis is not indicated.

9.  Disposition.  She is pending discharge to subacute rehab and is medically 
cleared for discharge.





CODE STATUS:  Full code.





__________________________________________

Jose Elias Edwards MD



DD:  03/20/2018 8:42:55

DT:  03/20/2018 9:21:06

Job # [id number]



MTDD

## 2018-03-21 NOTE — DS
ADMITTING DIAGNOSIS:  

Acute right femoral fracture status post mechanical fall.



DISCHARGE DIAGNOSES:  

Right femoral fracture s/p ORIF and pulmonary embolism (provoked).



SECONDARY DIAGNOSES:  

Left lower lobe pneumonia (resolved), CAD s/p NSTEMI, history of transient 
ischemic attack, history of mesenteric ischemia s/p right hemicolectomy s/p 
ileostomy with reversal, anemia of chronic disease and severe PVD.



CONSULTATIONS:  

Dr. Christianson (Cardiology) and Dr. Slade (Orthopedic Surgery).



IMAGING STUDIES:

1.  X-ray of right hip demonstrated acute proximal right femoral fracture.

2.  CT of the chest with IV contrast demonstrated right pulmonary emboli and 
left lower lobe consolidation consistent with pneumonia.



PROCEDURES:  

ORIF of acute right femoral fracture.



HISTORY OF PRESENT ILLNESS:  

The patient is a 77 year old woman with a past medical history of severe PVD, 
mesenteric ischemic s/p SMA stent with history of ischemic colitis s/p right 
hemicolectomy s/p ileostomy with reversal, CAD s/p NSTEMI and history of TIA 
who presented to [facility] ED for evaluation of acute onset of 
right hip pain after a mechanical fall.  The patient reports being in her usual 
state of health until the day of presentation to the ED when she tripped and 
fell in her right side.  She noted immediate onset of severe right hip pain and 
inability to ambulate or bear weight.  She crawled approximately 10 feet to the 
phone and called the EMS.  She was subsequently brought to [facility] ED for evaluation where workup demonstrated an acute proximal right 
femoral fracture. She was evaluated by Dr. Slade of Orthopedic Surgery 
and after cardiac clearance, she was taken to the OR for ORIF.



HOSPITAL COURSE:  

The patient underwent successful ORIF of her acute right femoral fracture.  She 
was transferred to the remote telemetry dee for continued postoperative care.  
She was maintained on Lovenox 30 mg SC daily for DVT prophylaxis.  
Approximately 12 hours after her orthopedic procedure, she was noted to develop 
mild respiratory distress and was found to be tachypneic and hypoxic.  A stat 
ABG demonstrated significant hypoxia with a pO2 of 56.  She was placed on a 
nonrebreather mask and sent for a stat CT of the chest which demonstrated a 
left lower lobe pneumonia and a right pulmonary embolism.  She was subsequently 
transferred to the CCU for continued care of left lower pneumonia and acute 
right pulmonary embolism.  Upon transfer to the CCU, she was started on 
Ceftriaxone 1 g IV daily and Azithromycin 500 mg IV daily, as well as a heparin 
drip.  She was bridged to Coumadin with a target INR of 2-3. Over the following 
36 hours she was noted to demonstrate significant improvement in her 
respiratory status and had resolution of her leukocytosis.  She was eventually 
changed to Levaquin 500 mg p.o. daily and scheduled to complete a 10 day course 
of antibiotics.  She was also bridged to Coumadin 4 mg p.o. daily after 3 days 
of dual anticoagulation therapy with a goal INR of 2-3.  Given her hemodynamic 
and respiratory stability during the CCU, she was evaluated by PT and 
recommendations were made for subacute rehab.  By hospital day #6, she was 
accepted to and deemed stable for discharge to subacute rehab.



CONDITION:  

Fair, improved.



DISPOSITION:  

[facility] facility.



DISCHARGE MEDICATIONS:  

Lipitor 80 mg p.o. daily, Coumadin 4 mg p.o. daily, Levaquin 500 mg p.o. daily (
to complete 10-day course) and Tramadol 50 mg p.o. q. 8 hours p.r.n. pain.



DISCHARGE INSTRUCTIONS:  

The patient was advised if she develops any chest pain, palpitations, dyspnea 
or hemoptysis, to notify her physicians at [facility] immediately.



FOLLOWUP:  

The patient will follow up with her PMD within 1 week of discharge.  The 
patient will follow up with Dr. Slade as scheduled.





__________________________________________

Jose Elias Edwards MD



DD:  03/20/2018 16:28:00

DT:  03/20/2018 17:53:09

Job # [id number]







CEDRIC

## 2018-05-04 ENCOUNTER — HOSPITAL ENCOUNTER (INPATIENT)
Dept: HOSPITAL 42 - ED | Age: 78
LOS: 4 days | Discharge: HOME | DRG: 812 | End: 2018-05-08
Attending: INTERNAL MEDICINE | Admitting: INTERNAL MEDICINE
Payer: MEDICARE

## 2018-05-04 VITALS — BODY MASS INDEX: 15 KG/M2

## 2018-05-04 DIAGNOSIS — J98.11: ICD-10-CM

## 2018-05-04 DIAGNOSIS — N39.0: ICD-10-CM

## 2018-05-04 DIAGNOSIS — T45.515A: ICD-10-CM
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
## 2018-05-04 DIAGNOSIS — I73.9: ICD-10-CM

## 2018-05-04 DIAGNOSIS — E78.00: ICD-10-CM

## 2018-05-04 DIAGNOSIS — Z86.73: ICD-10-CM

## 2018-05-04 DIAGNOSIS — S72.001D: ICD-10-CM

## 2018-05-04 DIAGNOSIS — Z86.19: ICD-10-CM

## 2018-05-04 DIAGNOSIS — I42.9: ICD-10-CM

## 2018-05-04 DIAGNOSIS — F17.200: ICD-10-CM

## 2018-05-04 DIAGNOSIS — Z79.01: ICD-10-CM

## 2018-05-04 DIAGNOSIS — Z93.3: ICD-10-CM

## 2018-05-04 DIAGNOSIS — I25.2: ICD-10-CM

## 2018-05-04 DIAGNOSIS — Z86.711: ICD-10-CM

## 2018-05-04 DIAGNOSIS — D47.3: ICD-10-CM

## 2018-05-04 DIAGNOSIS — D64.9: Primary | ICD-10-CM

## 2018-05-04 DIAGNOSIS — J44.9: ICD-10-CM

## 2018-05-04 DIAGNOSIS — Z91.19: ICD-10-CM

## 2018-05-04 DIAGNOSIS — I50.9: ICD-10-CM

## 2018-05-04 DIAGNOSIS — I25.10: ICD-10-CM

## 2018-05-04 DIAGNOSIS — D68.8: ICD-10-CM

## 2018-05-04 LAB
ALBUMIN SERPL-MCNC: 3.2 G/DL (ref 3–4.8)
ALBUMIN/GLOB SERPL: 1.1 {RATIO} (ref 1.1–1.8)
ALT SERPL-CCNC: 30 U/L (ref 7–56)
APTT BLD: 41.7 SECONDS (ref 25.1–36.5)
APTT BLD: 75.5 SECONDS (ref 25.1–36.5)
AST SERPL-CCNC: 29 U/L (ref 14–36)
BASOPHILS # BLD AUTO: 0.05 K/MM3 (ref 0–2)
BASOPHILS # BLD AUTO: 0.08 K/MM3 (ref 0–2)
BASOPHILS NFR BLD: 0.5 % (ref 0–3)
BASOPHILS NFR BLD: 0.7 % (ref 0–3)
BUN SERPL-MCNC: 17 MG/DL (ref 7–21)
CALCIUM SERPL-MCNC: 8.6 MG/DL (ref 8.4–10.5)
EOSINOPHIL # BLD: 0.1 10*3/UL (ref 0–0.7)
EOSINOPHIL # BLD: 0.2 10*3/UL (ref 0–0.7)
EOSINOPHIL NFR BLD: 1 % (ref 1.5–5)
EOSINOPHIL NFR BLD: 1.4 % (ref 1.5–5)
ERYTHROCYTE [DISTWIDTH] IN BLOOD BY AUTOMATED COUNT: 17.8 % (ref 11.5–14.5)
ERYTHROCYTE [DISTWIDTH] IN BLOOD BY AUTOMATED COUNT: 18.9 % (ref 11.5–14.5)
GFR NON-AFRICAN AMERICAN: > 60
GRANULOCYTES # BLD: 7.03 10*3/UL (ref 1.4–6.5)
GRANULOCYTES # BLD: 8.06 10*3/UL (ref 1.4–6.5)
GRANULOCYTES NFR BLD: 64.2 % (ref 50–68)
GRANULOCYTES NFR BLD: 67.5 % (ref 50–68)
HGB BLD-MCNC: 6.1 G/DL (ref 12–16)
HGB BLD-MCNC: 7.1 G/DL (ref 12–16)
INR PPP: 2.26 (ref 0.93–1.08)
INR PPP: 7.95 (ref 0.93–1.08)
LYMPHOCYTES # BLD: 1.8 10*3/UL (ref 1.2–3.4)
LYMPHOCYTES # BLD: 2 10*3/UL (ref 1.2–3.4)
LYMPHOCYTES NFR BLD AUTO: 15.3 % (ref 22–35)
LYMPHOCYTES NFR BLD AUTO: 18.5 % (ref 22–35)
MCH RBC QN AUTO: 28.6 PG (ref 25–35)
MCH RBC QN AUTO: 29.2 PG (ref 25–35)
MCHC RBC AUTO-ENTMCNC: 30.8 G/DL (ref 31–37)
MCHC RBC AUTO-ENTMCNC: 31.7 G/DL (ref 31–37)
MCV RBC AUTO: 90.3 FL (ref 80–105)
MCV RBC AUTO: 94.7 FL (ref 80–105)
MONOCYTES # BLD AUTO: 1.7 10*3/UL (ref 0.1–0.6)
MONOCYTES # BLD AUTO: 1.8 10*3/UL (ref 0.1–0.6)
MONOCYTES NFR BLD: 15.1 % (ref 1–6)
MONOCYTES NFR BLD: 15.8 % (ref 1–6)
PLATELET # BLD: 557 10^3/UL (ref 120–450)
PLATELET # BLD: 791 10^3/UL (ref 120–450)
PMV BLD AUTO: 10 FL (ref 7–11)
PMV BLD AUTO: 9.2 FL (ref 7–11)
PROTHROMBIN TIME: 26.2 SECONDS (ref 9.4–12.5)
PROTHROMBIN TIME: 94.5 SECONDS (ref 9.4–12.5)
RBC # BLD AUTO: 2.09 10^6/UL (ref 3.5–6.1)
RBC # BLD AUTO: 2.48 10^6/UL (ref 3.5–6.1)
TROPONIN I SERPL-MCNC: < 0.01 NG/ML
WBC # BLD AUTO: 11 10^3/UL (ref 4.5–11)
WBC # BLD AUTO: 11.9 10^3/UL (ref 4.5–11)

## 2018-05-04 PROCEDURE — 30233N1 TRANSFUSION OF NONAUTOLOGOUS RED BLOOD CELLS INTO PERIPHERAL VEIN, PERCUTANEOUS APPROACH: ICD-10-PCS | Performed by: INTERNAL MEDICINE

## 2018-05-04 NOTE — CARD
--------------- APPROVED REPORT --------------





EKG Measurement

Heart [id number]

MD [id number]

[id number]

[id number]

[id number]



<Conclusion>

Sinus rhythm with short MD with premature atrial complexes with 

aberrant conduction

Nonspecific ST abnormality

Abnormal ECG

## 2018-05-04 NOTE — CP.PCM.CON
<Belinda Hardy - Last Filed: 05/04/18 14:07>





History of Present Illness





- History of Present Illness


History of Present Illness: 





Belinda Hardy, PGY1, Consult Note for Dr Means:





Consult: anemia





77 year old female, with PMH recent right hi ORIF, PVD, mesenteric ischemia s/p 

SMA stent, CAD, TIA, presents for fatigue for past few days. Pt was sent to ED 

by PMD for "low blood count." Denies cp, sob, hematemesis, hemoptysis, melena, 

headache, nausea, vomiting, abdominal pain, fever, chills, leg swelling. Pt 

reports that she would like to have coffee and wants to go home.





In ED, patient afebrile, hemodynamically stable. SBP in 120s. CBC showed Hgb 

6.1 (baseline 8.5). 





12 point ROS obtained and negative, except as per HPI.





PMH: severe PVD, mesenteric ischemia s/p SMA stent w/ history of ischemic 

colitis s/p R hemicoelctomy s/p ileostomy w/ reversal, CAD s/p NSTEMI and TIA


PSH: R hip ORIF, JR/BSO, R hemicolectomy with illeostomy reversal


ALL: NKDA


SHx: drinks ETOH occasionally, denies any smoking or drugs 


FHx: Non contributory











Review of Systems





- Review of Systems


All systems: reviewed and no additional remarkable complaints except


Review of Systems: 





as per HPI





Past Patient History





- Infectious Disease


Hx of Infectious Diseases: None





- Past Medical History & Family History


Past Medical History?: Yes





- Past Social History


Smoking Status: Current Some Days Smoker





- CARDIAC


Hx Cardiac Disorders: Yes (mi)


Hx Congestive Heart Failure: Yes





- PULMONARY


Hx Respiratory Disorders: Yes





- NEUROLOGICAL


HX Cerebrovascular Accident: Yes





- HEENT


Hx HEENT Problems: Yes (eyeglasses)





- RENAL


Hx Chronic Kidney Disease: No





- ENDOCRINE/METABOLIC


Hx Endocrine Disorders: No





- HEMATOLOGICAL/ONCOLOGICAL


Hx Blood Disorders: Yes


Hx Blood Transfusions: Yes





- INTEGUMENTARY


Hx Dermatological Problems: Yes


Other/Comment: MULTIPLE BRUISING





- MUSCULOSKELETAL/RHEUMATOLOGICAL


Hx Musculoskeletal Disorders: Yes


Hx Falls: Yes


Hx Fractures: Yes (R HIP FRACTURE 3-12-18)


Hx Unsteady Gait: Yes





- GASTROINTESTINAL


Hx Gastrointestinal Disorders: Yes


Hx Clostridium Difficile: Yes


Hx Colostomy: Yes


Hx Diverticulitis: Yes





- GENITOURINARY/GYNECOLOGICAL


Hx Genitourinary Disorders: Yes


Hx Urinary Tract Infection: Yes





- PSYCHIATRIC


Hx Psychophysiologic Disorder: Yes


Hx Substance Use: No





- SURGICAL HISTORY


Hx Orthopedic Surgery: Yes (HIP R)


Other/Comment: ABD R/T DIVERTICULITIS





- ANESTHESIA


Hx Anesthesia Reactions: No


Hx Malignant Hyperthermia: No





Meds


Allergies/Adverse Reactions: 


 Allergies











Allergy/AdvReac Type Severity Reaction Status Date / Time


 


No Known Allergies Allergy   Verified 05/04/18 11:28














Physical Exam





- Constitutional


Appears: Non-toxic, No Acute Distress





- Head Exam


Head Exam: ATRAUMATIC, NORMOCEPHALIC





- Eye Exam


Eye Exam: EOMI, PERRL.  absent: Conjunctival injection, Nystagmus, Scleral 

icterus


Pupil Exam: NORMAL ACCOMODATION, PERRL.  absent: Fixed, Irregular, Unequal


Additional comments: 





+ pale conjunctiva





- ENT Exam


ENT Exam: Mucous Membranes Moist





- Neck Exam


Neck exam: Positive for: Full Rom





- Respiratory Exam


Respiratory Exam: Clear to Auscultation Bilateral, NORMAL BREATHING PATTERN.  

absent: Accessory Muscle Use, Chest Wall Tenderness, Rales, Rhonchi, Wheezes, 

Respiratory Distress, Stridor





- Cardiovascular Exam


Cardiovascular Exam: RRR, +S1, +S2.  absent: Systolic Murmur





- GI/Abdominal Exam


GI & Abdominal Exam: Normal Bowel Sounds, Soft.  absent: Diminished Bowel Sounds

, Distended, Firm, Guarding, Mass, Organomegaly, Rebound, Rigid, Tenderness





- Extremities Exam


Extremities exam: Positive for: normal inspection.  Negative for: calf 

tenderness, pedal edema





- Back Exam


Back exam: NORMAL INSPECTION





- Neurological Exam


Neurological exam: Alert, Oriented x3





- Psychiatric Exam


Psychiatric exam: Normal Affect, Normal Mood





- Skin


Skin Exam: Dry, Pallor, Warm


Additional comments: 





+ old bruising noted bilateral upper extremities





Results





- Vital Signs


Recent Vital Signs: 


 Last Vital Signs











Temp  99.4 F   05/04/18 11:42


 


Pulse  93 H  05/04/18 11:42


 


Resp  18   05/04/18 11:42


 


BP  122/64   05/04/18 11:42


 


Pulse Ox  99   05/04/18 11:42














- Labs


Result Diagrams: 


 05/04/18 12:19





 05/04/18 12:55


Labs: 


 Laboratory Results - last 24 hr











  05/04/18 05/04/18





  12:19 12:55


 


WBC  11.9 H D 


 


RBC  2.09 L 


 


Hgb  6.1 L* D 


 


Hct  19.8 L* 


 


MCV  94.7  D 


 


MCH  29.2 


 


MCHC  30.8 L 


 


RDW  18.9 H 


 


Plt Count  791 H* D 


 


MPV  10.0 


 


Gran %  67.5 


 


Lymph % (Auto)  15.3 L 


 


Mono % (Auto)  15.1 H 


 


Eos % (Auto)  1.4 L 


 


Baso % (Auto)  0.7 


 


Gran #  8.06 H 


 


Lymph # (Auto)  1.8 


 


Mono # (Auto)  1.8 H 


 


Eos # (Auto)  0.2 


 


Baso # (Auto)  0.08 


 


Sodium   148


 


Potassium   4.4


 


Chloride   113 H


 


Carbon Dioxide   25


 


Anion Gap   15


 


BUN   17


 


Creatinine   0.6 L


 


Est GFR ( Amer)   > 60


 


Est GFR (Non-Af Amer)   > 60


 


Random Glucose   103


 


Calcium   8.6


 


Total Bilirubin   0.1 L


 


AST   29


 


ALT   30


 


Alkaline Phosphatase   245 H D


 


Lactate Dehydrogenase   696


 


Total Creatine Kinase   89


 


Troponin I   < 0.01


 


Total Protein   6.2


 


Albumin   3.2


 


Globulin   2.9


 


Albumin/Globulin Ratio   1.1














Assessment & Plan





- Assessment and Plan (Free Text)


Assessment: 





77 year old female with PMH mesenteric ischemia s/p SMA stent, CAD, TIA, PVD, 

presents for anemia. Denies active bleeding, hematemesis, melena, sob, cp, 

abdominal pain. Pt is requesting for coffee and wants to go home.  Pt is 

afebrile, hemodynamically stable. Patient does not currently require ICU care. 

Can proceed with medical management of patient. 





Case seen and discussed with Dr Means.





- Date & Time


Date: 05/04/18


Time: 14:52





<Sean eMans - Last Filed: 05/04/18 15:11>





Results





- Vital Signs


Recent Vital Signs: 


 Last Vital Signs











Temp  99.4 F   05/04/18 11:42


 


Pulse  102 H  05/04/18 13:35


 


Resp  18   05/04/18 13:35


 


BP  124/80   05/04/18 13:35


 


Pulse Ox  95   05/04/18 13:35














- Labs


Result Diagrams: 


 05/04/18 12:19





 05/04/18 12:55


Labs: 


 Laboratory Results - last 24 hr











  05/04/18 05/04/18 05/04/18





  12:19 12:55 13:46


 


WBC  11.9 H D  


 


RBC  2.09 L  


 


Hgb  6.1 L* D  


 


Hct  19.8 L*  


 


MCV  94.7  D  


 


MCH  29.2  


 


MCHC  30.8 L  


 


RDW  18.9 H  


 


Plt Count  791 H* D  


 


MPV  10.0  


 


Gran %  67.5  


 


Lymph % (Auto)  15.3 L  


 


Mono % (Auto)  15.1 H  


 


Eos % (Auto)  1.4 L  


 


Baso % (Auto)  0.7  


 


Gran #  8.06 H  


 


Lymph # (Auto)  1.8  


 


Mono # (Auto)  1.8 H  


 


Eos # (Auto)  0.2  


 


Baso # (Auto)  0.08  


 


Sodium   148 


 


Potassium   4.4 


 


Chloride   113 H 


 


Carbon Dioxide   25 


 


Anion Gap   15 


 


BUN   17 


 


Creatinine   0.6 L 


 


Est GFR ( Amer)   > 60 


 


Est GFR (Non-Af Amer)   > 60 


 


Random Glucose   103 


 


Calcium   8.6 


 


Total Bilirubin   0.1 L 


 


AST   29 


 


ALT   30 


 


Alkaline Phosphatase   245 H D 


 


Lactate Dehydrogenase   696 


 


Total Creatine Kinase   89 


 


Troponin I   < 0.01 


 


Total Protein   6.2 


 


Albumin   3.2 


 


Globulin   2.9 


 


Albumin/Globulin Ratio   1.1 


 


Blood Type    O POSITIVE


 


Antibody Screen    Negative


 


Crossmatch    See Detail


 


BBK History Checked    Patient has bt














Assessment & Plan





- Assessment and Plan (Free Text)


Assessment: 


Patient seen and examined with resident agree with note with following additions

/exceptions:


77 year old female, with PMH recent right hi ORIF, PVD, mesenteric ischemia s/p 

SMA stent, CAD, TIA, presents for fatigue for past few days. Pt was sent to ED 

by PMD for anemia. Currently afebrile, HD stable, comfortable in NAD, hgb noted 

6.1. Last WADE 125/70, HR 80s, awake, alert in NAD, requesting coffee and to be 

sent home. 








Coagulopathy


Anemia


GIB





Recommend:


- supp o2 as needed


- pan culture,


- HOLD BP meds


- IVF hydration


- Check FOBT


- NPO


- CT A/P with IV contrast if renal function permits


- transfuse 2u PRBC


- check INR, reverse INR if any coagulopathy present, with FFP, VitK


- GI eval


- DVT ppx, SCDs


- Monitor in telemetry, STABLE

## 2018-05-04 NOTE — ED PDOC
Arrival/HPI





- General


Chief Complaint: Abnormal Labs


Time Seen by Provider: 05/04/18 11:24


Historian: Patient





- History of Present Illness


Narrative History of Present Illness (Text): 


05/04/18 12:32


A 77 year old female, whose past medical history includes recent hip fracture 

and anemia, presents to the emergency department complaining of fatigue and 

shortness of breath over the past week. Patient reports her symptoms worsen 

with exertion. Patient had an outpatient blood test done yesterday which 

revealed a "low blood count." Patient was instructed to come the emergency room 

yesterday but states she was unable to until today. Patient denies any fever, 

chills, nausea, vomiting, diarrhea, dark or bloody stools, hematemesis, 

hemoptysis, abdominal pain, chest pain or any other complaints.





Time/Duration: 1 week


Context: Home





Past Medical History





- Provider Review


Nursing Documentation Reviewed: Yes





- Past History


Past History: No Previous





- Infectious Disease


Hx of Infectious Diseases: None





- Past Medical History


Past Medical History: Non-Contributing





- Cardiac


Hx Cardiac Disorders: Yes (mi)


Hx Congestive Heart Failure: Yes





- Pulmonary


Hx Respiratory Disorders: Yes





- Neurological


HX Cerebrovascular Accident: Yes





- HEENT


Hx HEENT Disorder: Yes (eyeglasses)





- Renal


Hx Renal Disorder: No





- Endocrine/Metabolic


Hx Endocrine Disorders: No





- Hematological/Oncological


Hx Blood Disorders: Yes


Hx Blood Transfusions: Yes





- Integumentary


Hx Dermatological Disorder: Yes


Other/Comment: MULTIPLE BRUISING





- Musculoskeletal/Rheumatological


Hx Musculoskeletal Disorders: Yes


Hx Falls: Yes


Hx Fractures: Yes (R HIP FRACTURE 3-12-18)


Hx Unsteady Gait: Yes





- Gastrointestinal


Hx Gastrointestinal Disorders: Yes


Hx Clostridium Difficile: Yes


Hx Colostomy: Yes


Hx Diverticulitis: Yes





- Genitourinary/Gynecological


Hx Genitourinary Disorders: Yes


Hx Urinary Tract Infection: Yes





- Psychiatric


Hx Psychophysiologic Disorder: Yes


Hx Substance Use: No





- Surgical History


Hx Orthopedic Surgery: Yes (HIP R)


Other/Comment: ABD R/T DIVERTICULITIS





- Anesthesia


Hx Anesthesia Reactions: No


Hx Malignant Hyperthermia: No





- Suicidal Assessment


Feels Threatened In Home Enviroment: No





Family/Social History





- Physician Review


Nursing Documentation Reviewed: Yes


Family/Social History: No Known Family HX


Smoking Status: Current Some Days Smoker


Hx Alcohol Use: Yes


Frequency of alcohol use: Socially


Hx Substance Use: No





Allergies/Home Meds


Allergies/Adverse Reactions: 


Allergies





No Known Allergies Allergy (Verified 05/04/18 11:28)


 








Home Medications: 


 Home Meds











 Medication  Instructions  Recorded  Confirmed


 


Atorvastatin [Lipitor] 80 mg PO DAILY 05/08/18 05/08/18


 


Metoprolol Tartrate [Lopressor] 25 mg PO DAILY 05/08/18 05/08/18














Review of Systems





- Review of Systems


Constitutional: Fatigue.  absent: Fevers, Night Sweats


ENT: absent: Hearing Changes, Sore Throat, Epistaxis


Respiratory: SOB


Cardiovascular: absent: Chest Pain, Palpitations, Edema, Calf Pain, DACOSTA


Gastrointestinal: absent: Abdominal Pain, Stool Changes, Diarrhea, Nausea, 

Vomiting, Hematemesis, Other (hemoptysis)


Genitourinary Female: absent: Dysuria, Hematuria, Urine Output Changes


Musculoskeletal: Arthralgias.  absent: Back Pain, Neck Pain


Skin: Other (multiple bruises).  absent: Rash


Neurological: absent: Headache, Dizziness, Focal Weakness


Endocrine: absent: Polyuria


Hemo/Lymphatic: Easy Bleeding, Easy Bruising


Psychiatric: absent: Depression, Suicidal Ideation





Physical Exam





- Physical Exam


Narrative Physical Exam (Text): 


Head:  Atraumatic.  Normocephalic. 


Eyes:  PERRL.  EOMI.  Conjunctivae are pale.


ENT:  Mucous membranes are moist and intact.  Oropharynx is clear and 

symmetric. No epistaxis. Abrasion noted to nasal bridge no active bleeding.


Neck:  Supple.  Full ROM.  No JVD.  No lymphadenopathy.


Cardiovascular:  Regular rate.  Regular rhythm. Systolic murmur noted. Distal 

pulses intact.


Pulmonary/Chest:  No evidence of respiratory distress.  Clear to auscultation 

bilaterally.  No wheezing, rales or rhonchi. No tachypnea.


Abdominal:  Soft and non-distended.  There is no tenderness.  No rebound, 

guarding, or rigidity.  No organomegaly.  Good bowel sounds. 


Back:  No CVA tenderness. No midline tenderness.


Extremities:  No edema.   No cyanosis.  No clubbing.  Full range of motion in 

all extremities.  No calf tenderness. Multiple ecchymotic lesions to upper 

extremities and lower extremities. Superficial abrasion to right elbow. No 

acute bony pain.


Rectal: no melena, no gross bleeding noted


Skin:  Skin is pale. Multiple ecchymotic lesions noted.


Neurological:  Alert, awake, and oriented to person, place, time, and 

situation.  Normal speech. Motor and sensory exam intact.


Psychiatric:  Good eye contact.  Normal interaction, affect, and behavior.











Vital Signs Reviewed: Yes


Vital Signs











  Temp Pulse Resp BP Pulse Ox


 


 05/04/18 20:23   83  20  131/69  100


 


 05/04/18 19:30   87  20  113/77  96


 


 05/04/18 18:00   80  20  141/72 


 


 05/04/18 16:53  99.5 F  81  20  134/50 L 


 


 05/04/18 16:08  99.5 F  86  22  141/31 L 


 


 05/04/18 15:42  99.8 F H  79  18  129/55 L 


 


 05/04/18 15:15   94 H  18  122/74  95


 


 05/04/18 13:35   102 H  18  124/80  95


 


 05/04/18 11:42  99.4 F  93 H  18  122/64  99


 


 05/04/18 11:29  99.4 F  93 H  17  122/64  97











Temperature: Afebrile


Blood Pressure: Normal


Pulse: Regular


Respiratory Rate: Normal


Appearance: Positive for: Well-Appearing, Non-Toxic, Comfortable


Pain Distress: None


Mental Status: Positive for: Alert and Oriented X 3





Medical Decision Making


ED Course and Treatment: 


05/04/18 12:32


Impression:


A 77 year old female with fatigue and shortness of breath. Sent in for abnormal 

blood work from yesterday. 





Differential dx: gi bleed, anemia, coagulopathy  





Plan:


-- EKG


-- Labs


-- Reassess and disposition





Progress Notes:


Patient presents to ED with family. On serial exams and monitoring, she is NOT 

tachycardic and not hypotensive. She continues to DENY any chest pain or 

shortness of breath. She denies abdominal pain. Rectal exam reveals NO melena 

or bloody stool.


Patient reports that she is taking Warfarin.


Review of past records reveal patient recently treated for hip fracture and was 

diagnosed with pulmonary embolism in March of this year. Hgb today is 6.1. She 

reports she was told it was "8" yesterday. 


INR is 7.9. She states she has not taken her Warfarin "for two days because I 

ran out".


Intensivist was consulted and updated on INR and current exam.


As she requires close monitoring of her vital signs, hemoglobin,  and INR will 

admit to ICU.


I reviewed case with Dr. Beth, and consulted Dr. Bauer.


As patient with recent PE, will require close monitoring of INR and after 

consultation with pulmonary will administer Vitamin K 5 mg at this time with 

repeat h/h and INR to be signed out to consultant for follow-up.


Patient accepted to ICU by Dr. Means.














- Critical Care


Critical Care Minutes: 45 minutes





- Lab Interpretations


Lab Results: 








 05/04/18 12:19 





 05/04/18 12:55 





 Lab Results





05/04/18 13:46: Blood Type O POSITIVE, Antibody Screen Negative, Crossmatch See 

Detail, BBK History Checked Patient has bt


05/04/18 12:55: Sodium 148, Potassium 4.4, Chloride 113 H, Carbon Dioxide 25, 

Anion Gap 15, BUN 17, Creatinine 0.6 L, Est GFR ( Amer) > 60, Est GFR (

Non-Af Amer) > 60, Random Glucose 103, Calcium 8.6, Total Bilirubin 0.1 L, AST 

29, ALT 30, Alkaline Phosphatase 245 H D, Lactate Dehydrogenase 696, Total 

Creatine Kinase 89, Troponin I < 0.01, Total Protein 6.2, Albumin 3.2, Globulin 

2.9, Albumin/Globulin Ratio 1.1


05/04/18 12:19: WBC 11.9 H D, RBC 2.09 L, Hgb 6.1 L* D, Hct 19.8 L*, MCV 94.7  D

, MCH 29.2, MCHC 30.8 L, RDW 18.9 H, Plt Count 791 H* D, MPV 10.0, Gran % 67.5, 

Lymph % (Auto) 15.3 L, Mono % (Auto) 15.1 H, Eos % (Auto) 1.4 L, Baso % (Auto) 

0.7, Gran # 8.06 H, Lymph # (Auto) 1.8, Mono # (Auto) 1.8 H, Eos # (Auto) 0.2, 

Baso # (Auto) 0.08











- RAD Interpretation


: Radiologist





- EKG Interpretation


EKG Interpretation (Text): 





EKG at 12:07 sinus rhythm with short pr and premature arial complexes with 

nonspecific st abnormality





Interpreted by ED Physician: Yes


Type: 12 lead EKG





- Medication Orders


Current Medication Orders: 











Discontinued Medications





Cyanocobalamin (Vitamin B12 1000 Mcg/Ml Inj)  1,000 mcg IM ONCE ONE


   Stop: 05/06/18 17:21


   Last Admin: 05/06/18 17:43  Dose: 1,000 mcg





IM Administration Charges


 Document     05/06/18 17:43  BROOKE  (Rec: 05/06/18 17:43  BROOKE  [id number])


     Injection Site


      MAR Injection Site                         Right Deltoid


     Charges for Administration


      # of IM Administrations                    1





Darbepoetin Alessandro (Aranesp)  100 mcg SC ONCE ONE


   Stop: 05/06/18 12:32


   Last Admin: 05/06/18 13:39  Dose: 100 mcg





Subcutaneous Administrations


 Document     05/06/18 13:39  BROOKE  (Rec: 05/06/18 13:39  BROOKE  [id number])


     Injection Site


      MAR Injection Site                         Left Deltoid


     Charges for Administration


      # of Subcutaneous Administrations          1





Phytonadione 5 mg/ Sodium (Chloride)  50.5 mls @ 100 mls/hr IV ONCE ONE


   Stop: 05/04/18 17:43


   Last Admin: 05/04/18 18:15  Dose: 100 mls/hr





eMAR Start Stop


 Document     05/04/18 18:15  SS  (Rec: 05/04/18 18:15  SS  [id number])


     Intravenous Solution


      Start Date                                 05/04/18


      Start Time                                 18:15


      End Date                                   05/04/18


      End time                                   19:15


      Total Infusion Time                        60





Iron Sucrose 100 mg/ Sodium (Chloride)  105 mls @ 210 mls/hr IVPB ONCE ONE


   Stop: 05/06/18 23:14


   Last Admin: 05/07/18 05:51 Dose:  Not Given


   Non-Admin Reason: Patient Refused





Ceftriaxone Sodium (Rocephin 1 Gram Ivpb)  1 gm in 100 mls @ 100 mls/hr IVPB 

DAILY PATRICK


   PRN Reason: Protocol


   Last Admin: 05/08/18 10:25  Dose: 100 mls/hr





eMAR Start Stop


 Document     05/08/18 10:25  CHRISTIAN  (Rec: 05/08/18 10:25  CHRISTIAN  [id number])


     Intravenous Solution


      Start Date                                 05/08/18


      Start Time                                 10:25





Pantoprazole Sodium (Protonix Ec Tab)  40 mg PO ACB PATRICK


   Last Admin: 05/08/18 10:25  Dose: 40 mg





Polysaccharide Iron Complex (Ferrex-150)  150 mg PO DAILY PATRICK


   Last Admin: 05/08/18 10:25  Dose: 150 mg





Tramadol HCl (Ultram)  50 mg PO Q8 PRN


   PRN Reason: Pain, moderate (4-7)











- Scribe Statement


The provider has reviewed the documentation as recorded by the Kashif Valentine





Provider Gerryibe Attestation:


All medical record entries made by the Scribe were at my direction and 

personally dictated by me. I have reviewed the chart and agree that the record 

accurately reflects my personal performance of the history, physical exam, 

medical decision making, and the department course for this patient. I have 

also personally directed, reviewed, and agree with the discharge instructions 

and disposition.








Disposition/Present on Arrival





- Present on Arrival


Any Indicators Present on Arrival: Yes


History of DVT/PE: Yes


History of Uncontrolled Diabetes: No


Urinary Catheter: No


History of Decub. Ulcer: No


History Surgical Site Infection Following: None





- Disposition


Have Diagnosis and Disposition been Completed?: Yes


Diagnosis: 


 Anemia, Elevated INR





Disposition: HOSPITALIZED


Disposition Time: 17:41


Patient Plan: Admission, ICU


Condition: CRITICAL

## 2018-05-04 NOTE — CP.PCM.PN
Subjective





- Date & Time of Evaluation


Date of Evaluation: 05/04/18


Time of Evaluation: 19:08





- Subjective


Subjective: 


Labs noted


Will transfuse 2u prbc, VitK IV


repeat CBC, Q6hr, INR


Admit to MICU





Objective





- Vital Signs/Intake and Output


Vital Signs (last 24 hours): 


 











Temp Pulse Resp BP Pulse Ox


 


 99.5 F   80   20   141/72   95 


 


 05/04/18 16:53  05/04/18 18:00  05/04/18 18:00  05/04/18 18:00  05/04/18 15:15








Intake and Output: 


 











 05/04/18 05/05/18





 18:59 06:59


 


Intake Total 0 


 


Balance 0 














- Medications


Medications: 


 Current Medications





Pantoprazole Sodium (Protonix Ec Tab)  40 mg PO ACB PATRICK


Tramadol HCl (Ultram)  50 mg PO Q8 PRN


   PRN Reason: Pain, moderate (4-7)











- Labs


Labs: 


 











PT  94.5 SECONDS (9.4-12.5)  H  05/04/18  16:14    


 


INR  7.95  (0.93-1.08)  H*  05/04/18  16:14    


 


APTT  75.5 Seconds (25.1-36.5)  H  05/04/18  16:14

## 2018-05-05 LAB
% IRON SATURATION: 21 % (ref 20–55)
ALBUMIN SERPL-MCNC: 3.2 G/DL (ref 3–4.8)
ALBUMIN/GLOB SERPL: 1 {RATIO} (ref 1.1–1.8)
ALT SERPL-CCNC: 31 U/L (ref 7–56)
AST SERPL-CCNC: 33 U/L (ref 14–36)
BUN SERPL-MCNC: 20 MG/DL (ref 7–21)
CALCIUM SERPL-MCNC: 8.5 MG/DL (ref 8.4–10.5)
ERYTHROCYTE [DISTWIDTH] IN BLOOD BY AUTOMATED COUNT: 16.7 % (ref 11.5–14.5)
FOLATE SERPL-MCNC: 6.3 NG/ML
GFR NON-AFRICAN AMERICAN: > 60
HDLC SERPL-MCNC: 49 MG/DL (ref 29–60)
HGB BLD-MCNC: 8.7 G/DL (ref 12–16)
IRON SERPL-MCNC: 74 UG/DL (ref 45–180)
LDLC SERPL-MCNC: < 30 MG/DL (ref 0–129)
MCH RBC QN AUTO: 29.5 PG (ref 25–35)
MCHC RBC AUTO-ENTMCNC: 32.6 G/DL (ref 31–37)
MCV RBC AUTO: 90.5 FL (ref 80–105)
PLATELET # BLD: 546 10^3/UL (ref 120–450)
PMV BLD AUTO: 9.8 FL (ref 7–11)
RBC # BLD AUTO: 2.95 10^6/UL (ref 3.5–6.1)
TIBC SERPL-MCNC: 344 UG/DL (ref 265–497)
VIT B12 SERPL-MCNC: 397 PG/ML (ref 239–931)
WBC # BLD AUTO: 10.5 10^3/UL (ref 4.5–11)

## 2018-05-05 RX ADMIN — PANTOPRAZOLE SODIUM SCH MG: 40 TABLET, DELAYED RELEASE ORAL at 08:55

## 2018-05-05 NOTE — RAD
HISTORY:

sob with exertion  



COMPARISON:

03/14/2018 



FINDINGS:



LUNGS:

No active pulmonary disease.



PLEURA:

No significant pleural effusion identified, no pneumothorax apparent.



CARDIOVASCULAR:

Normal.



OSSEOUS STRUCTURES:

No significant abnormalities.



VISUALIZED UPPER ABDOMEN:

Normal.



OTHER FINDINGS:

None.



IMPRESSION:

No active disease.

## 2018-05-05 NOTE — CON
DATE:  05/05/2018



PULMONARY CONSULT



REFERRING PHYSICIAN:  Alejandrina Beth MD.



REASON FOR CONSULT:  History of pulmonary embolism, anemia.



HISTORY OF PRESENT ILLNESS:  This is a 77-year-old female, known to me from

previous admission, has a history of recent hip fracture, anemia, found to

have pulmonary embolism.  She was started on anticoagulation.  Has

outpatient testing done, which shows low hemoglobin and not feeling well,

came to emergency room where hemoglobin was around 6 or so, but INR about 7

or so.  I spoke to ER physician yesterday, was advised to give her vitamin

K 5 mg IV one dose and follow up H and H as well as INR.  Noted that she

received blood transfusion today.  She feels much better.  No headache.  No

rhinitis.  There is no hemoptysis.  No hematemesis.  No hematuria.  No

hematochezia.  No melena.  No sign of bleed or bruise reported.



PAST MEDICAL HISTORY:  Hip fracture, pulmonary embolism, anemia, history of

C. diff colitis in the past, history of diverticulitis, UTIs.



FAMILY HISTORY:  No significant cardiopulmonary disease reported.



SOCIAL HISTORY:  Positive history of smoking.  Denies any alcohol use.



ALLERGIES:  NONE KNOWN.



MEDICATIONS:  She is on Protonix 40 mg daily, Ultram 50 mg every 8 hours

p.r.n.  At home, she was taking Coumadin 4 mg daily, also was on metoprolol

12.5 mg twice a day.



REVIEW OF SYSTEMS:  Presently, no headache, no rhinitis, no cough, no

sputum production, no chest pain, no abdominal pain, no dysuria.  Has

multiple lower extremity bruises.  No edema, though.



PHYSICAL EXAMINATION:

GENERAL:  Lying in the bed.  No acute distress.

VITAL SIGNS:  Temperature is 98, heart rate 77, respiratory rate is 20,

blood pressure 141/55, pulse ox 98% on nasal cannula.

HEENT:  Small oral cavity.  Crowded airway.

NECK:  Supple.  No JVD.

LUNGS:  Fair airflow with rhonchi.

HEART:  S1 and S2.

ABDOMEN:  Soft, nontender.  No organomegaly.

EXTREMITY:  Has multiple bruises.

NEUROLOGICAL:  Awake, alert.  Follows simple command.



LABORATORY DATA:  Shows hemoglobin 8.7, hematocrit 26.7, WBC 10.5, platelet

is 546.  INR 2.26, PTT is 42.  Sodium 148, potassium 3.8, chloride 113,

bicarbonate 25, BUN 20, creatinine 0.7, glucose 89, calcium is 8.5, iron is

74, AST 33, ALT 31, alk phos is 209, albumin 3.2.  TSH 0.41.  Vitamin B12

is 396.  Folate is 6.3.  Iron was 74.



IMPRESSION AND PLAN:  History of pulmonary embolism, hip fracture, may be

component of chronic lung disease, anemia, iatrogenic coagulopathy, status

post blood transfusion, clinically much better.  We will repeat CTA.  If

there is no thrombus, may decide not to anticoagulate.  For now, I will

give Coumadin 2 mg today.  Follow up CBC, INR in the morning.  Fall

precaution.



Thank you and we will follow with you.





__________________________________________

Soheila Bauer MD





DD:  05/05/2018 19:01:36

DT:  05/05/2018 19:06:50

Job # [id number]

## 2018-05-05 NOTE — CT
EXAM:

  CT Angiography Chest With Intravenous Contrast



CLINICAL HISTORY:

  77 years old, female; Signs and symptoms; Other: Pe study



TECHNIQUE:

  Axial computed tomographic angiography images of the chest with intravenous 

contrast using pulmonary embolism protocol.  All CT scans at this facility use 

one or more dose reduction techniques, viz.: automated exposure control; ma/kV 

adjustment per patient size (including targeted exams where dose is matched to 

indication; i.e. head); or iterative reconstruction technique.

  MIP reconstructed images were created and reviewed.

  Coronal and sagittal reformatted images were created and reviewed.



CONTRAST:

  100 mL of [id number] administered intravenously.



COMPARISON:

  CT - ANGIO CHEST PE PROTOCOL 2018-03-14 23:09



FINDINGS:

  Limitations:  Motion artifact - mild.  Motion artifact - mild.

  Pulmonary arteries:  No definite pulmonary embolism.

  Aorta:  Moderate atherosclerotic disease.  No aneurysm.  Occlusion of 

visualized infrarenal aorta with SMA stent, present on previous examination.

  Lungs:  Mild peripheral atelectasis/scarring.  Probable apical scarring.  No 

consolidation.

  Pleural space:  No significant effusion.  No pneumothorax.

  Heart:  Mild cardiomegaly.  No significant pericardial effusion.  Minimal 

coronary artery calcifications.

  Bones/joints:  Mild compression deformity inferior endplate T9 vertebral 

body, subacute.  Mild compression deformity superior endplate L1 vertebral 

body, acute or subacute.

  Soft tissues:  Unremarkable.

  Lymph nodes:  No pathologically enlarged lymph nodes.

  Spleen:  Splenules or splenic remnant.

  Adrenals:  Mild hypertrophy of adrenal glands.



IMPRESSION:     

1.  No definite CT evidence of pulmonary embolism.

2.  Incidental/non-acute findings are described above.

## 2018-05-05 NOTE — CP.PCM.PN
Subjective





- Date & Time of Evaluation


Date of Evaluation: 05/05/18


Time of Evaluation: 07:10





- Subjective


Subjective: 





Patient seen and examined, reports no major complaints. 





Objective





- Vital Signs/Intake and Output


Vital Signs (last 24 hours): 


 











Temp Pulse Resp BP Pulse Ox


 


 99.4 F   77   24   155/57 H  99 


 


 05/05/18 04:00  05/05/18 06:00  05/05/18 03:40  05/05/18 03:00  05/05/18 03:50








Intake and Output: 


 











 05/05/18 05/05/18





 06:59 18:59


 


Intake Total 1330 


 


Output Total 540 


 


Balance 790 














- Medications


Medications: 


 Current Medications





Pantoprazole Sodium (Protonix Ec Tab)  40 mg PO ACB PATRICK


   Last Admin: 05/05/18 08:55 Dose:  40 mg


Tramadol HCl (Ultram)  50 mg PO Q8 PRN


   PRN Reason: Pain, moderate (4-7)











- Labs


Labs: 


 





 05/05/18 05:00 





 05/05/18 05:00 





 











PT  26.2 SECONDS (9.4-12.5)  H  05/04/18  22:46    


 


INR  2.26  (0.93-1.08)  H  05/04/18  22:46    


 


APTT  41.7 Seconds (25.1-36.5)  H  05/04/18  22:46    














- Constitutional


Appears: Non-toxic, No Acute Distress





- Head Exam


Head Exam: NORMAL INSPECTION





- Eye Exam


Eye Exam: Normal appearance





- ENT Exam


ENT Exam: Mucous Membranes Moist





- Neck Exam


Neck Exam: Full ROM





- Respiratory Exam


Respiratory Exam: Clear to Ausculation Bilateral, NORMAL BREATHING PATTERN





- Cardiovascular Exam


Cardiovascular Exam: REGULAR RHYTHM, +S1, +S2





- GI/Abdominal Exam


GI & Abdominal Exam: Soft, Normal Bowel Sounds





- Extremities Exam


Extremities Exam: Normal Inspection





- Neurological Exam


Neurological Exam: Alert, Awake, Oriented x3





Assessment and Plan





- Assessment and Plan (Free Text)


Assessment: 





77 year old female, with PMH recent right hi ORIF, PVD, mesenteric ischemia s/p 

SMA stent, CAD, TIA, presents for fatigue for past few days. Pt was sent to ED 

by PMD for anemia. Currently afebrile, HD stable, comfortable in NAD, hgb noted 

6.1, given 2u PRBC, and VitK IV 5mg x 1. HH stable





Coagulopathy


Anemia


GIB





Recommend:


- supp o2 as needed


- pan culture,


- HOLD BP meds


- IVF hydration


- Check FOBT


- resume diet


- monitor CBC


- monitor INR


- GI eval


- DVT ppx, SCDs


- Transfer to telemetry, stable

## 2018-05-06 RX ADMIN — PANTOPRAZOLE SODIUM SCH MG: 40 TABLET, DELAYED RELEASE ORAL at 08:32

## 2018-05-06 NOTE — PN
DATE:  05/05/2018



SUBJECTIVE:  Patient was seen and examined at the bedside on 05/05/2018 and

looking comfortable.  I saw her in the unit.  No nausea, vomiting or

diarrhea.  No hematuria or hematochezia.  No swelling of the leg.  No chest

pain.  No palpitation.  No headache.  No dizziness.



PHYSICAL EXAMINATION:

VITAL SIGNS:   Patient is afebrile.  Temperature 99.1, blood

pressure 141/55.

HEENT:  Head:  Normocephalic, atraumatic.  Eyes:  PERRLA.  Extraocular

muscles are intact.  Conjunctivae clear.  Nose patent.  Mucous membranes

moist.

NECK:  Supple.  No carotid bruit, JVD or thyromegaly.

CHEST:  Bilaterally symmetrical.

HEART:  S1 and S2 positive.

LUNGS:  Clear to auscultation.

ABDOMEN:  Soft.  Bowel sounds present.  No organomegaly.

EXTREMITIES:  No edema.  No cyanosis.

NEUROLOGIC:  Patient is awake, alert, moving all 4 extremities.  No focal

deficit.



MEDICATIONS:  Coumadin, Protonix, and tramadol.



LABORATORY DATA:  White blood cell 10.5, hemoglobin 8.7, hematocrit 26.7,

platelets 546.  Sodium 148, potassium 3.8, BUN 20, creatinine 0.7, glucose

89.



ASSESSMENT AND PLAN:  Ms. Lorenza Coleman is a 77-year-old lady came with

severe anemia, hemoglobin 6.1, was admitted in the unit, couple of packed

rbc's given, now hemoglobin is 8.7.  INR was 7.95, vitamin K given, now

repeat INR is 2.26.  History of hip fracture recently, pulmonary embolism,

that is why she was on Coumadin.  She has Clostridium difficile toxin

colitis in the past, history of diverticulitis, urinary tract infection,

chronic obstructive pulmonary disease.  Clinically, patient is improved. 

Dr. Bauer started Coumadin 2 mg.  CAT scan of the chest is done.  No

definite CT evidence of pulmonary embolism.  Incidental acute finding is 
remnants, mild hypertrophy of the adrenal gland, mild compression

deformity of superior endplate L1 vertebral body, acute or subacute mild

compression deformity of inferior endplate of T9 vertebral, mild

cardiomyopathy, mild peripheral atelectasis, probably apical scarring.  No

consolidation.  We will continue further treatment.  Gastrointestinal and

deep vein thrombosis prophylaxis.  Patient's assessment is under my care in

[facility].  Repeat labs.  We will follow up.





__________________________________________

Alejandrina Beth MD



DD:  05/06/2018 0:27:36

DT:  05/06/2018 3:09:00

[facility] # [id number]

[id number]

## 2018-05-06 NOTE — PN
DATE:  05/06/2018



PULMONARY PROGRESS NOTE



REFERRING PHYSICIAN:  Alejandrina Beth MD.



SUBJECTIVE:  She is lying in the bed, head at 45 degrees, sleepy,

arousable.  No headache.  No rhinitis.  No nausea.  No vomiting.  No

abdominal pain.  No dysuria, leg pain or leg swelling.  No melena.  No

hematochezia.  No hematuria reported.



OBJECTIVE:

GENERAL:  No acute distress.

VITAL SIGNS:  Temperature is 98, heart rate is 75, respiratory rate is 18,

blood pressure 114/62, pulse ox 95% on room air.

HEENT:  Moist mucous membrane.  Crowded airway.

NECK:  Supple.  No JVD.

LUNGS:  Have a fair airflow with rhonchi.

HEART:  S1 and S2.

ABDOMEN:  Soft, nontender.  No organomegaly.

EXTREMITIES:  No edema.

NEUROLOGICAL:  Sleepy, arousable.  Follows simple command.



MEDICATIONS:  She is on Protonix 40 mg daily, Ultram 50,000 subcu every 8

hours.



LABORATORY DATA:  Shows hemoglobin 8.7, hematocrit 26.7, WBC 10.5, platelet

is 546.  Sodium 148, hemoglobin A1c 5.2.  Her iron is 74.  B12 is 393. 

Folate 6.3.  CT scan of the chest was done, which was negative for any

pulmonary embolism.



IMPRESSION AND PLAN:  Status post fall with hip fracture requiring hip

replacement ended up with pulmonary embolism, treated with anticoagulation

ended up with iatrogenic coagulopathy with a drop of hemoglobin, most

likely gastrointestinal loss, but there is no apparent hematochezia,

melena.  After admission, received 2 units of packed red blood cells until

CT of the chest is negative for pulmonary embolism at the present time. 

Considering risk and benefit ratio with elderly lady with multiple bruises

of the lower extremity and life-threatening anemia, I will suggest

discontinue Coumadin.  May give her sequential compression device to lower

extremity.  If deep venous thrombosis or pulmonary embolism reoccur, we

will reconsider, but for now I think considering risk and benefit ratio,

should be off anticoagulation.  We will give her iron and Aranesp 100 mcg

IM one dose, also give one dose of vitamin B12.  Follow up CBC, but should

keep daytime CLEMENTINE stocking on.



Thank you and we will follow with you.





__________________________________________

Mohammad Juancarlos, MD





DD:  05/06/2018 17:20:56

DT:  05/06/2018 17:25:26

[facility] # [id number]

## 2018-05-07 LAB
ALBUMIN SERPL-MCNC: 3.2 G/DL (ref 3–4.8)
ALBUMIN/GLOB SERPL: 1.1 {RATIO} (ref 1.1–1.8)
ALT SERPL-CCNC: 29 U/L (ref 7–56)
AMORPH SED URNS QL MICRO: (no result)
APPEARANCE UR: (no result)
AST SERPL-CCNC: 29 U/L (ref 14–36)
BACTERIA #/AREA URNS HPF: (no result) /[HPF]
BILIRUB UR-MCNC: NEGATIVE MG/DL
BUN SERPL-MCNC: 15 MG/DL (ref 7–21)
CALCIUM SERPL-MCNC: 8.8 MG/DL (ref 8.4–10.5)
COLOR UR: YELLOW
ERYTHROCYTE [DISTWIDTH] IN BLOOD BY AUTOMATED COUNT: 17.5 % (ref 11.5–14.5)
GFR NON-AFRICAN AMERICAN: > 60
GLUCOSE UR STRIP-MCNC: NEGATIVE MG/DL
HGB BLD-MCNC: 8.4 G/DL (ref 12–16)
HGB OTHER MFR BLD ELPH: (no result) /HPF (ref 0–2)
LEUKOCYTE ESTERASE UR-ACNC: (no result) LEU/UL
MCH RBC QN AUTO: 29.3 PG (ref 25–35)
MCHC RBC AUTO-ENTMCNC: 31.6 G/DL (ref 31–37)
MCV RBC AUTO: 92.7 FL (ref 80–105)
PH UR STRIP: 7.5 [PH] (ref 4.7–8)
PLATELET # BLD: 480 10^3/UL (ref 120–450)
PMV BLD AUTO: 9.7 FL (ref 7–11)
PROT UR STRIP-MCNC: NEGATIVE MG/DL
RBC # BLD AUTO: 2.87 10^6/UL (ref 3.5–6.1)
RBC # UR STRIP: (no result) /UL
SP GR UR STRIP: 1.01 (ref 1–1.03)
UROBILINOGEN UR STRIP-ACNC: 0.2 E.U./DL
WBC # BLD AUTO: 12.1 10^3/UL (ref 4.5–11)
WBC #/AREA URNS HPF: (no result) /HPF (ref 0–6)

## 2018-05-07 RX ADMIN — Medication SCH MG: at 09:55

## 2018-05-07 RX ADMIN — PANTOPRAZOLE SODIUM SCH MG: 40 TABLET, DELAYED RELEASE ORAL at 08:06

## 2018-05-07 NOTE — HP
This is a 77-year-old female.



CHIEF COMPLAINT:  Abnormal labs.



HISTORY OF PRESENT ILLNESS:  a 77-year-old lady, was seen in

the rehab, has past medical history of hip fracture and anemia, came to the

Emergency Department complaining of fatigue and shortness of breath over

the past week.  The patient reports her symptoms worse with exertion.  The

patient has outpatient blood test done yesterday, which revealed low blood

count and INR is high.  The patient was instructed to come to the Emergency

Room yesterday, but states that she was unable to do yesterday, but today

first she went to see her  who is in the rehab, then came to the

Emergency Room.  Denies any fever, chills, nausea, vomiting, diarrhea, dark

or bloody stool.  No hematemesis.  No hematochezia.  No chest pain.  No

abdominal pain.



PAST MEDICAL HISTORY:  MI, congestive heart failure, history of blood

transfusion, fall, history of right hip fracture on 03/12/2018, ataxia,

history of colostomy, diverticulosis, history of C. diff toxin colitis.



FAMILY HISTORY:  Father and mother, noncontributory.



HABITS:  Still smoking.  Alcohol, yes, socially.  Substance abuse, no.



ALLERGIES:  THE PATIENT IS NOT ALLERGIC WITH ANY MEDICATIONS.



REVIEW OF SYSTEMS:  The patient is seen and examined on the bedside in the

ER.  The patient is not a good historian, is not big complainer.  Still

feeling fatigue, but no fever, no chills, no night sweats, no sore throat,

no epistaxis, but complaining about shortness of breath.  No chest pain,

palpitations, edema, calf pain.  Complaining about abdominal pain.  No

stool changes.  No diarrhea, nausea, vomiting.  No hematuria, hematochezia.

She had multiple bruises.  No headache.  No dizziness.  No focal deficits. 

No polyuria, but the patient had easy bruising.



PHYSICAL EXAMINATION:

VITAL SIGNS:  Temperature 99.5, pulse  80 , respiratory rate 20, blood

pressure 134/50.

HEENT:  Head is normocephalic and atraumatic.  Eyes; PERRLA.  Extraocular

muscles are intact.  Conjunctivae are clear.  Nose is patent.  Mucous

membranes are moist.

NECK:  Supple.  No carotid bruit.  No JVD or thyromegaly.

CHEST:  Bilaterally symmetrical.

HEART:  S1 and S2 positive.

LUNGS:  Clear to auscultation.

ABDOMEN:  Soft.  Bowel sounds positive.  No organomegaly.

EXTREMITIES:  No edema.  No cyanosis.  

NEUROLOGICAL:  The patient is awake and alert.  Moving all 4 extremities. 

No focal deficits.



LABORATORY DATA:  White blood cell is 11.9, hemoglobin 6.1, hematocrit

19.8, platelets  noted .  Sodium 148, potassium 4.4, BUN 40, creatinine 0.6,

glucose 103.





ASSESSMENT AND PLAN:  a 77-year-old lady with leukocytosis,

anemia, thrombocytosis, hypercholesterolemia, has coagulopathy, INR was

7.95.  The patient has recent history of PE; so, spoke to

Dr. Bauer and they discontinued the idea of  they gave vitamin K. 

Repeat INR is 2.26.   blood transfusion and hemoglobin increased

from 6.1 to 7.1.  Seen by the ICU intensivist, admitted the patient in the

ICU.  Was better vitamin K IV.  Gastrointestinal and deep venous

thrombosis prophylaxis.  .  Protonix for gastrointestinal prophylaxis.  Repeat 
labs.  We will follow up.





__________________________________________

Alejandrina Beth MD



DD:  05/04/2018 23:56:37

DT:  05/05/2018 1:59:57

Job # [id number]

[id number]

## 2018-05-07 NOTE — PN
DATE:  05/06/2018



SUBJECTIVE:  The patient is a 77-year-old female.  Patient is seen and

examined at the bedside.  Looking comfortable.  No nausea, vomiting or

diarrhea.  No hematuria.  No hematochezia.  No swelling of the leg.  No

chest pain.  No palpitation.  No headache.  No dizziness.  No dysuria.



PHYSICAL EXAMINATION:

VITAL SIGNS:  Temperature 98, heart rate 75, respiratory rate 18, blood

pressure 120/60, pulse oximetry 95% on room air.

HEENT:  Head:  Normocephalic, atraumatic.  Eyes:  PERRLA.  Extraocular

muscles are intact.  Conjunctivae clear.  Nose patent.  Mucous membranes

moist.

NECK:  Supple.  No carotid bruit, JVD or thyromegaly.

CHEST:  Bilaterally symmetrical.

HEART:  S1 and S2 positive.

LUNGS:  Clear to auscultation.

ABDOMEN:  Soft, nontender.  No organomegaly.

EXTREMITIES:  No edema.  No cyanosis.

NEUROLOGIC:  Patient is awake, alert, moving all 4 extremities.  No focal

deficit.



MEDICATIONS:  Protonix, tramadol.



LABORATORY DATA:  Hemoglobin 8.7, hematocrit 26.7, white blood cells 10.5,

platelets 546.  Sodium 148, potassium 3.8.  Hemoglobin A1c 5.2.



ASSESSMENT AND PLAN:  Ms. Lorenza Coleman is a 77-year-old lady came with

coagulopathy, severe anemia, hemoglobin 6.1, after couple of blood

transfusions, now it is 8.7, status post fall, history of hip fracture

requiring hip replacement ended up in pulmonary embolism, got treatment

with anticoagulation ended up with iatrogenic coagulopathy with the drop of

hemoglobin, most likely gastrointestinal loss, but there is no apparent

hematochezia, melena.  CT of the chest was negative for pulmonary embolism

at this present time.  Considering the risk and benefit ratio with elderly

lady with multiple bruises, history of falls, life-threatening anemia,

noncompliant because she is alone, had no supportive system,  is in

the [facility]. stopped the Coumadin, appreciated.  We will

give sequential compression device to the lower extremity.  If deep venous

thrombosis or pulmonary embolism will reoccur, we will reconsider for

putting , now got iron, Aranesp, B12.  Gastrointestinal and deep vein

thrombosis prophylaxis.  We will follow up.







__________________________________________

Alejandrina Beth MD



DD:  05/06/2018 22:32:30

DT:  05/07/2018 1:14:20

[facility] # [id number]

CEDRIC

## 2018-05-07 NOTE — PN
DATE:  05/07/2018



PULMONARY PROGRESS NOTE



REFERRING PHYSICIAN:  Alejandrina Beth MD.



SUBJECTIVE:  The patient is lying in the bed, head at 45 degrees, sleepy,

arousable.  No headache, no rhinitis.  No nausea, vomiting, or diarrhea. 

No leg pain or leg swelling.



PHYSICAL EXAMINATION:

GENERAL:  In no acute distress.

VITAL SIGNS:  Temperature is 98, heart rate 71, respiratory rate is 20,

blood pressure 125/49, pulse ox 97% on room air.

HEENT:  Moist mucous membrane.  Crowded airway.

NECK:  Supple.  No JVD.

LUNGS:  Have a fair airflow with rhonchi.

HEART:  S1 and S2.

ABDOMEN:  Soft, nontender.  No organomegaly.

EXTREMITIES:  No edema.

NEUROLOGIC:  Sleepy, arousable.  Follows simple command.



MEDICATIONS:  She is on Ferrex 150 mg daily, Protonix 40 mg daily, and

Ultram 50 mg every 8hours p.r.n.



LABORATORY DATA:  Shows hemoglobin 8.4, hematocrit 26.6, WBC 12, platelet

is 480.  Sodium 144, potassium 4.5, chloride 105, bicarbonate 28, BUN 15,

creatinine 0.7, glucose 158, calcium is 8.8.  AST 29, ALT 29, alkaline

phosphatase is 174, albumin is 3.2.  Chest x-ray shows no infiltrate or

effusion.



IMPRESSION AND PLAN:  Status post fall with hip fracture, ended up with

pulmonary embolism requiring anticoagulation, admitted with severe anemia,

symptomatic, has iatrogenic coagulopathy.  Repeat CTA shows no pulmonary

embolism.  Decision made to discontinue Coumadin.  The patient was given

Aranesp and iron supplement.  Continue to follow CBC.  Could be discharged.

Fall precaution.  Deep venous thrombosis precaution.



Thank you and we will follow with you.







__________________________________________

Soheila Bauer MD



DD:  05/07/2018 19:23:27

DT:  05/07/2018 19:26:23

Job # [id number]

## 2018-05-07 NOTE — RAD
HISTORY:

?pne  



COMPARISON:

05/04/2018 



FINDINGS:



LUNGS:

No active pulmonary disease.



PLEURA:

No significant pleural effusion identified, no pneumothorax apparent.



CARDIOVASCULAR:

Normal.



OSSEOUS STRUCTURES:

No significant abnormalities.



VISUALIZED UPPER ABDOMEN:

Normal.



OTHER FINDINGS:

None.



IMPRESSION:

No active disease.

## 2018-05-08 VITALS
HEART RATE: 75 BPM | DIASTOLIC BLOOD PRESSURE: 57 MMHG | RESPIRATION RATE: 18 BRPM | SYSTOLIC BLOOD PRESSURE: 116 MMHG | OXYGEN SATURATION: 98 % | TEMPERATURE: 98.9 F

## 2018-05-08 LAB
ALBUMIN SERPL-MCNC: 3.3 G/DL (ref 3–4.8)
ALBUMIN/GLOB SERPL: 1.1 {RATIO} (ref 1.1–1.8)
ALT SERPL-CCNC: 25 U/L (ref 7–56)
APPEARANCE UR: (no result)
AST SERPL-CCNC: 29 U/L (ref 14–36)
BACTERIA #/AREA URNS HPF: (no result) /[HPF]
BILIRUB UR-MCNC: NEGATIVE MG/DL
BUN SERPL-MCNC: 22 MG/DL (ref 7–21)
CALCIUM SERPL-MCNC: 9 MG/DL (ref 8.4–10.5)
COLOR UR: (no result)
ERYTHROCYTE [DISTWIDTH] IN BLOOD BY AUTOMATED COUNT: 18 % (ref 11.5–14.5)
GFR NON-AFRICAN AMERICAN: > 60
GLUCOSE UR STRIP-MCNC: NEGATIVE MG/DL
HGB BLD-MCNC: 8.3 G/DL (ref 12–16)
LEUKOCYTE ESTERASE UR-ACNC: (no result) LEU/UL
MCH RBC QN AUTO: 29.2 PG (ref 25–35)
MCHC RBC AUTO-ENTMCNC: 31.3 G/DL (ref 31–37)
MCV RBC AUTO: 93.3 FL (ref 80–105)
PH UR STRIP: 6.5 [PH] (ref 4.7–8)
PLATELET # BLD: 453 10^3/UL (ref 120–450)
PMV BLD AUTO: 9.2 FL (ref 7–11)
PROT UR STRIP-MCNC: (no result) MG/DL
RBC # BLD AUTO: 2.84 10^6/UL (ref 3.5–6.1)
RBC # UR STRIP: (no result) /UL
SP GR UR STRIP: 1.01 (ref 1–1.03)
UROBILINOGEN UR STRIP-ACNC: 0.2 E.U./DL
WBC # BLD AUTO: 11.5 10^3/UL (ref 4.5–11)
WBC #/AREA URNS HPF: (no result) /HPF (ref 0–6)

## 2018-05-08 RX ADMIN — PANTOPRAZOLE SODIUM SCH MG: 40 TABLET, DELAYED RELEASE ORAL at 10:25

## 2018-05-08 RX ADMIN — Medication SCH MG: at 10:25

## 2018-05-08 NOTE — CP.PCM.CON
History of Present Illness





- History of Present Illness


History of Present Illness: 


77 year old female with PMH of right sided hemicolectomy and ileostomy, S/P 

appendectomy, S/P hysterectomy initially came in to [facility] because of fatigue and 

generalized weakness for the past several days and the patient is noted to have 

chronic anemia. Urinalysis was also done because the patient complained of an 

episode of dysuria, which is showing pyuria. She denies flank pain, no hematuria

, no fever or chills, no nausea or vomiting, no chest pain, no headache or 

dizziness, no abdominal pain, no diarrhea, SOB, no cough or rhinorrhea. 

Infectious diseases consult is requested to further evaluate and manage.





Review of Systems





- Review of Systems


All systems: reviewed and no additional remarkable complaints except (as per HPI

)





Past Patient History





- Infectious Disease


Hx of Infectious Diseases: None





- Past Medical History & Family History


Past Medical History?: Yes





- Past Social History


Smoking Status: Current Some Days Smoker





- CARDIAC


Hx Cardiac Disorders: Yes (MI)


Hx Congestive Heart Failure: Yes





- PULMONARY


Hx Respiratory Disorders: Yes





- NEUROLOGICAL


HX Cerebrovascular Accident: Yes





- HEENT


Hx HEENT Problems: Yes (EYEGLASSES)





- RENAL


Hx Chronic Kidney Disease: No





- ENDOCRINE/METABOLIC


Hx Endocrine Disorders: No





- HEMATOLOGICAL/ONCOLOGICAL


Hx Blood Disorders: Yes


Hx Anemia: Yes





- INTEGUMENTARY


Hx Dermatological Problems: Yes


Other/Comment: MULTIPLE BRUISING & SKIN TEARS





- MUSCULOSKELETAL/RHEUMATOLOGICAL


Hx Musculoskeletal Disorders: Yes


Hx Falls: Yes


Hx Fractures: Yes (R HIP FRACTURE 3-12-18)


Hx Unsteady Gait: Yes





- GASTROINTESTINAL


Hx Gastrointestinal Disorders: Yes


Hx Colostomy: Yes


Hx Diverticulitis: Yes





- GENITOURINARY/GYNECOLOGICAL


Hx Genitourinary Disorders: Yes


Hx Urinary Tract Infection: Yes





- PSYCHIATRIC


Hx Psychophysiologic Disorder: Yes


Hx Substance Use: No





- SURGICAL HISTORY


Hx Orthopedic Surgery: Yes (HIP R)


Other/Comment: ABD R/T DIVERTICULITIS





- ANESTHESIA


Hx Anesthesia Reactions: No


Hx Malignant Hyperthermia: No





Meds


Home Medications: 


 Home Medication List











 Medication  Instructions  Recorded  Confirmed  Type


 


Cefpodoxime [Vantin] 200 mg PO BID 7 Days #14 tab 05/08/18  Rx


 


Iron Polysaccharide [Ferrex-150] 150 mg PO DAILY 30 Days #30 cap 05/08/18  Rx











Allergies/Adverse Reactions: 


 Allergies











Allergy/AdvReac Type Severity Reaction Status Date / Time


 


No Known Allergies Allergy   Verified 05/04/18 11:28














- Medications


Medications: 


 Current Medications





Ceftriaxone Sodium (Rocephin 1 Gram Ivpb)  1 gm in 100 mls @ 100 mls/hr IVPB 

DAILY PATRICK


   PRN Reason: Protocol


   Last Admin: 05/08/18 10:25 Dose:  100 mls/hr


Pantoprazole Sodium (Protonix Ec Tab)  40 mg PO [facility]


   Last Admin: 05/08/18 10:25 Dose:  40 mg


Polysaccharide Iron Complex (Ferrex-150)  150 mg PO DAILY [facility]


   Last Admin: 05/08/18 10:25 Dose:  150 mg


Tramadol HCl (Ultram)  50 mg PO Q8 PRN


   PRN Reason: Pain, moderate (4-7)











Physical Exam





- Constitutional


Appears: Cachectic, Chronically Ill





- Head Exam


Head Exam: NORMAL INSPECTION





- ENT Exam


ENT Exam: Mucous Membranes Moist





- Neck Exam


Neck exam: Negative for: Meningismus





- Respiratory Exam


Respiratory Exam: Decreased Breath Sounds





- Cardiovascular Exam


Cardiovascular Exam: +S1, +S2





- GI/Abdominal Exam


GI & Abdominal Exam: Soft.  absent: Tenderness





Results





- Vital Signs


Recent Vital Signs: 


 Last Vital Signs











Temp  98.4 F   05/08/18 00:00


 


Pulse  72   05/08/18 00:00


 


Resp  20   05/08/18 00:00


 


BP  116/67   05/08/18 00:00


 


Pulse Ox  116 H  05/08/18 00:00














- Labs


Result Diagrams: 


 05/08/18 06:15





 05/08/18 06:15


Labs: 


 Laboratory Results - last 24 hr











  05/07/18 05/08/18 05/08/18





  12:50 06:15 06:15


 


WBC   11.5 H 


 


RBC   2.84 L 


 


Hgb   8.3 L 


 


Hct   26.5 L 


 


MCV   93.3 


 


MCH   29.2 


 


MCHC   31.3 


 


RDW   18.0 H 


 


Plt Count   453 H 


 


MPV   9.2 


 


Sodium    144


 


Potassium    4.5


 


Chloride    106


 


Carbon Dioxide    27


 


Anion Gap    15


 


BUN    22 H


 


Creatinine    0.7


 


Est GFR ( Amer)    > 60


 


Est GFR (Non-Af Amer)    > 60


 


Random Glucose    114 H


 


Calcium    9.0


 


Total Bilirubin    0.2


 


AST    29


 


ALT    25


 


Alkaline Phosphatase    183 H


 


Total Protein    6.3


 


Albumin    3.3


 


Globulin    3.0


 


Albumin/Globulin Ratio    1.1


 


Urine Color  Yellow  


 


Urine Appearance  Sl cloudy  


 


Urine pH  7.5  


 


Ur Specific Gravity  1.015  


 


Urine Protein  Negative  


 


Urine Glucose (UA)  Negative  


 


Urine Ketones  Negative  


 


Urine Blood  Trace-intact H  


 


Urine Nitrate  Positive H  


 


Urine Bilirubin  Negative  


 


Urine Urobilinogen  0.2  


 


Ur Leukocyte Esterase  Moderate H  


 


Urine RBC  5 - 10  


 


Urine WBC  20 - 25  


 


Ur Epithelial Cells  4 - 5  


 


Amorphous Sediment  Few  


 


Urine Bacteria  Many  


 


Coarse Granular Casts  Trace H  


 


Urine Other  Uyeast  














Assessment & Plan





- Assessment and Plan (Free Text)


Plan: 





Assessment


R/O UTI


history of asymptomatic bacteriuria with Proteus


Anemia, etiology to be determined


right sided hemicolectomy and ileostomy S/P reversal Nov. 2016


S/P appendectomy


S/P hysterectomy





Plan


patient has been started on Ceftriaxone pending urine cx - may use Vantin 

instead of Ceftriaxone

## 2018-05-08 NOTE — PN
DATE:  05/07/2018



SUBJECTIVE:  The patient is 77-year-old female.  The patient was seen and

examined on the bedside.  Looking comfortable.  No nausea, vomiting, or

diarrhea.  No hematuria or hematochezia.  No swelling of the legs.  No

chest pain.  No palpitation.  No headache.  No dizziness.



PHYSICAL EXAMINATION:

VITAL SIGNS:  Temperature 98, heart rate 71, respiratory rate 20, blood

pressure 127/79, pulse oximetry 99% on room air.

HEENT:  Head, normocephalic and atraumatic.  Eyes, PERRLA.  Extraocular

muscles intact.  Conjunctivae clear.  Nose patent.  Mucous membrane moist.

NECK:  Supple.  No carotid bruit, JVD, or thyromegaly.

CHEST:  Bilaterally symmetrical.

HEART:  S1, S2 positive.

LUNGS:  Clear to auscultation.

ABDOMEN:  Soft.  Bowel sounds present.  No organomegaly.

EXTREMITIES:  No edema.  No cyanosis.

NEUROLOGIC:  Patient is awake, alert.  Moving all four extremities.  No

focal deficit.



MEDICATIONS:   Ferrex, Protonix and tramadol.



LABORATORY DATA:  Hemoglobin 8.4, hematocrit 26.6, white blood cells 12,

platelets 480.  Sodium 144, potassium of 4.5, BUN 15, creatinine 0.7.  AST

29, ALT 29.



Chest x-ray shows no infiltrates or effusion.



ASSESSMENT AND PLAN:  a 77-year-old lady status post fall with

hip fracture, ended up with pulmonary emboli requiring anticoagulation,

came this time with coagulopathy; history of severe anemia, symptomatic;

had iatrogenic coagulopathy, repeat CTA shows no pulmonary emboli. 

Decision made to discontinue the Coumadin, especially due to coagulopathy

evidence.  The patient was given Aranesp and iron transfusion.  Continue

follow up CBC.  Gastrointestinal and deep vein thrombosis.  Repeat labs. 

We will discharge home tomorrow if white blood cell will be stable. 

Otherwise, patient has a history of leukocytosis.  We will follow up.





__________________________________________

Alejandrina Beth MD



DD:  05/07/2018 23:54:26

DT:  05/08/2018 0:54:16

Job # [id number]

MTDD

## 2018-12-31 ENCOUNTER — HOSPITAL ENCOUNTER (INPATIENT)
Dept: HOSPITAL 42 - ED | Age: 78
LOS: 8 days | Discharge: HOME | DRG: 690 | End: 2019-01-08
Attending: INTERNAL MEDICINE | Admitting: INTERNAL MEDICINE
Payer: MEDICARE

## 2018-12-31 VITALS — BODY MASS INDEX: 17.3 KG/M2

## 2018-12-31 DIAGNOSIS — M17.11: ICD-10-CM

## 2018-12-31 DIAGNOSIS — I25.10: ICD-10-CM

## 2018-12-31 DIAGNOSIS — Z23: ICD-10-CM

## 2018-12-31 DIAGNOSIS — I10: ICD-10-CM

## 2018-12-31 DIAGNOSIS — Z86.711: ICD-10-CM

## 2018-12-31 DIAGNOSIS — E78.5: ICD-10-CM

## 2018-12-31 DIAGNOSIS — Z91.19: ICD-10-CM

## 2018-12-31 DIAGNOSIS — N39.0: Primary | ICD-10-CM

## 2018-12-31 DIAGNOSIS — T39.95XA: ICD-10-CM

## 2018-12-31 DIAGNOSIS — I25.2: ICD-10-CM

## 2018-12-31 DIAGNOSIS — D64.9: ICD-10-CM

## 2018-12-31 DIAGNOSIS — E78.00: ICD-10-CM

## 2018-12-31 DIAGNOSIS — I95.2: ICD-10-CM
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
## 2018-12-31 LAB
ALBUMIN SERPL-MCNC: 4.9 G/DL (ref 3–4.8)
ALBUMIN/GLOB SERPL: 1.4 {RATIO} (ref 1.1–1.8)
ALT SERPL-CCNC: 27 U/L (ref 7–56)
APPEARANCE UR: (no result)
APTT BLD: 28.6 SECONDS (ref 25.1–36.5)
AST SERPL-CCNC: 27 U/L (ref 14–36)
BACTERIA #/AREA URNS HPF: (no result) /HPF
BASOPHILS # BLD AUTO: 0.1 K/MM3 (ref 0–2)
BASOPHILS NFR BLD: 0.9 % (ref 0–3)
BILIRUB UR-MCNC: NEGATIVE MG/DL
BUN SERPL-MCNC: 21 MG/DL (ref 7–21)
CALCIUM SERPL-MCNC: 9.9 MG/DL (ref 8.4–10.5)
COLOR UR: YELLOW
EOSINOPHIL # BLD: 0.1 10*3/UL (ref 0–0.7)
EOSINOPHIL NFR BLD: 1 % (ref 1.5–5)
ERYTHROCYTE [DISTWIDTH] IN BLOOD BY AUTOMATED COUNT: 15.1 % (ref 11.5–14.5)
GFR NON-AFRICAN AMERICAN: 44
GLUCOSE UR STRIP-MCNC: NEGATIVE MG/DL
GRANULOCYTES # BLD: 8.12 10*3/UL (ref 1.4–6.5)
GRANULOCYTES NFR BLD: 76.6 % (ref 50–68)
HGB BLD-MCNC: 10.6 G/DL (ref 12–16)
INR PPP: 0.92
LEUKOCYTE ESTERASE UR-ACNC: (no result) LEU/UL
LYMPHOCYTES # BLD: 1.4 10*3/UL (ref 1.2–3.4)
LYMPHOCYTES NFR BLD AUTO: 13.1 % (ref 22–35)
MCH RBC QN AUTO: 33.8 PG (ref 25–35)
MCHC RBC AUTO-ENTMCNC: 31.5 G/DL (ref 31–37)
MCV RBC AUTO: 107.3 FL (ref 80–105)
MONOCYTES # BLD AUTO: 0.9 10*3/UL (ref 0.1–0.6)
MONOCYTES NFR BLD: 8.4 % (ref 1–6)
PH UR STRIP: 6.5 [PH] (ref 4.7–8)
PLATELET # BLD: 348 10^3/UL (ref 120–450)
PMV BLD AUTO: 10.4 FL (ref 7–11)
PROT UR STRIP-MCNC: 100 MG/DL
PROTHROMBIN TIME: 10.6 SECONDS (ref 9.4–12.5)
RBC # BLD AUTO: 3.14 10^6/UL (ref 3.5–6.1)
RBC # UR STRIP: (no result) /UL
SP GR UR STRIP: >= 1.03 (ref 1–1.03)
TROPONIN I SERPL-MCNC: < 0.01 NG/ML
UROBILINOGEN UR STRIP-ACNC: 0.2 E.U./DL
WBC # BLD AUTO: 10.6 10^3/UL (ref 4.5–11)
WBC #/AREA URNS HPF: (no result) /HPF (ref 0–6)

## 2018-12-31 PROCEDURE — 3E0234Z INTRODUCTION OF SERUM, TOXOID AND VACCINE INTO MUSCLE, PERCUTANEOUS APPROACH: ICD-10-PCS

## 2018-12-31 NOTE — ED PDOC
Arrival/HPI





- General


Chief Complaint: Trauma


Historian: Patient





- History of Present Illness


Narrative History of Present Illness (Text): 





12/31/18 11:08


77 y/o female, pmh including htn/hld/MI/GI bleed/colitis/hip fracture/NSTEMI, 

nkda, last tetanus doesn't remember, c/o fall on the rt. knee x 1 hour.  Pt. 

stated that she has been fatigue for the past few days, not feeling well, trying

to get into the car today and feeling fatigue, fall on the rt. knee with 

swelling and sustained skin tear on the left forearm (refused sutures), no 

head/neck/back injury, no numbness or tingling, no headache or night sweat, no 

palpitation, no chest pain, no change in vision, no rash, no other medical or 

psychological complaints. 





Past Medical History





- Provider Review


Nursing Documentation Reviewed: Yes





- Past History


Past History: No Previous





- Infectious Disease


Hx of Infectious Diseases: None





- Reproductive


Menopause: Yes





- Past Medical History


Past Medical History: Non-Contributing





- Cardiac


Hx Cardiac Disorders: Yes (mi)


Hx Congestive Heart Failure: Yes





- Pulmonary


Hx Respiratory Disorders: Yes





- Neurological


HX Cerebrovascular Accident: Yes





- HEENT


Hx HEENT Disorder: Yes (eyeglasses)





- Renal


Hx Renal Disorder: No





- Endocrine/Metabolic


Hx Endocrine Disorders: No





- Hematological/Oncological


Hx Blood Disorders: Yes


Hx Blood Transfusions: Yes





- Integumentary


Hx Dermatological Disorder: Yes


Other/Comment: MULTIPLE BRUISING





- Musculoskeletal/Rheumatological


Hx Musculoskeletal Disorders: Yes


Hx Falls: Yes


Hx Fractures: Yes (R HIP FRACTURE 3-12-18)


Hx Unsteady Gait: Yes





- Gastrointestinal


Hx Gastrointestinal Disorders: Yes


Hx Clostridium Difficile: Yes


Hx Colostomy: Yes


Hx Diverticulitis: Yes





- Genitourinary/Gynecological


Hx Genitourinary Disorders: Yes


Hx Urinary Tract Infection: Yes





- Psychiatric


Hx Psychophysiologic Disorder: Yes


Hx Substance Use: No





- Surgical History


Hx Orthopedic Surgery: Yes (HIP R)


Other/Comment: ABD R/T DIVERTICULITIS





- Anesthesia


Hx Anesthesia Reactions: No


Hx Malignant Hyperthermia: No





- Suicidal Assessment


Feels Threatened In Home Enviroment: No





Family/Social History





- Physician Review


Nursing Documentation Reviewed: Yes


Family/Social History: Unknown Family HX


Smoking Status: Current Some Days Smoker


Hx Alcohol Use: Yes


Hx Substance Use: No





Allergies/Home Meds


Allergies/Adverse Reactions: 


Allergies





No Known Allergies Allergy (Verified 05/04/18 11:28)


   








Home Medications: 


                                    Home Meds











 Medication  Instructions  Recorded  Confirmed


 


Atorvastatin [Lipitor] 80 mg PO DAILY 05/08/18 12/31/18


 


Metoprolol Tartrate [Lopressor] 25 mg PO DAILY 05/08/18 12/31/18














Review of Systems





- Review of Systems


Constitutional: Fatigue.  absent: Fevers


Eyes: absent: Vision Changes


ENT: absent: Hearing Changes


Respiratory: absent: SOB, Cough


Cardiovascular: absent: Chest Pain


Gastrointestinal: absent: Abdominal Pain, Nausea, Vomiting


Musculoskeletal: Arthralgias, Myalgias.  absent: Back Pain


Skin: Laceration.  absent: Rash, Pruritis, Skin Lesions, Abscess, Ulcer, 

Cellulitis


Neurological: absent: Headache, Dizziness


Psychiatric: absent: Anxiety, Depression, Suicidal Ideation





Physical Exam


Vital Signs Reviewed: Yes





Vital Signs











  Temp Pulse Resp BP Pulse Ox


 


 12/31/18 10:24  98.4 F  57 L  18  148/63  97











Temperature: Afebrile


Blood Pressure: Normal


Pulse: Bradycardic


Respiratory Rate: Normal


Appearance: Positive for: Well-Appearing, Non-Toxic, Comfortable


Pain Distress: Mild


Mental Status: Positive for: Alert and Oriented X 3





- Systems Exam


Head: Present: Atraumatic, Normocephalic, Other (no facial tenderness or 

swelling. ).  No: Tenderness, Contusion, Swelling, Ecchymosis, Abrasion, 

Laceration


Pupils: Present: PERRL


Extroacular Muscles: Present: EOMI


Conjunctiva: Present: Normal


Ears: Present: NORMAL TM, Normal Canal.  No: Erythema


Mouth: Present: Moist Mucous Membranes


Pharnyx: No: ERYTHEMA, EXUDATE, TONSILS ENLARGED


Nose (External): Present: Atraumatic.  No: Abrasion, Contusion, Laceration


Nose (Internal): Present: Normal Inspection, No Active Bleeding.  No: 

Rhinorrhea, Septal Hematoma, Epistaxis


Neck: Present: Normal Range of Motion, Trachea Midline.  No: Meningeal Signs, 

MIDLINE TENDERNESS, Paraspinal Tenderness, Lymphadenopathy


Respiratory/Chest: Present: Clear to Auscultation, Good Air Exchange.  No: 

Respiratory Distress, Accessory Muscle Use, Wheezes, Decreased Breath Sounds, 

Rales, Retracting, Rhonchi, Tachypneic, Tender to Palpation


Cardiovascular: Present: Regular Rate and Rhythm, Normal S1, S2.  No: Murmurs


Abdomen: No: Tenderness, Distention, Peritoneal Signs, Rebound, Guarding


Back: Present: Normal Inspection.  No: CVA Tenderness, Midline Tenderness, 

Paraspinal Tenderness


Upper Extremity: Present: Normal Inspection, Normal ROM, NORMAL PULSES, 

Neurovascularly Intact, Capillary Refill < 2s, Other (Lt. forearm: visible v 

shaped skin tear approx. 3cm superficial with no scaphoid or bony tenderness, 

FROM without limitation, sensation intact, motor 5/5, +radial pulse, capillary 

refill< 2 seconds, neurovascular intact. ).  No: Cyanosis, Edema, Tenderness, 

Swelling, Erythema, Temperature Abnormalties, Deformity


Lower Extremity: Present: Normal Inspection, Other (Rt. LE: +ttp on the lateral 

aspect of the knee with no swelling, mild ecchymosis on the proximal lateral 

fibula region, no other tenderness or swelling, FROM without limitation, sensat

ion intact, motor 5/5, +radial pulse, capillary refill< 2 seconds, neurovasclar 

intact. ).  No: Edema


Neurological: Present: GCS=15, CN II-XII Intact, Speech Normal, Motor Func 

Grossly Intact, Gait Normal, Memory Normal


Skin: Present: Warm, Dry, Normal Color.  No: Rashes


Psychiatric: Present: Alert, Oriented x 3, Normal Insight, Normal Concentration





Medical Decision Making


ED Course and Treatment: 





12/31/18 11:13


-labs


-xray


-tdap/tylenol


-I recommend suturing but the patient refused and stated that she has this skin 

tear frequently, refused the wound to be sutured, understand the risk of 

infection and scarring.  I irrigated the wound with normal saline 1000cc, clean 

with betadine, bacitracin and gauze dressing.


-EKG


-Observe and reassess





12/31/18 15:04


-EKG: Sinus Bradycardia @ 59 BPM with PAC, no acute ST or T wave changes 

compared with previous ekgs.


-Chest xray: ER wet read: no acative disease


-Rt. tibia/fibula xray:  ER wet read: no fracture or dislocation. 


-Rt. knee xray:  ER wet read: no fracture or dislocation. 


-Labs show no acute findings except hgb 10.6 with , K+ 5.4 (elevated, 

normal creatine and no tall T waves) with kayaxylate ordered. 


-Mg within normal limit


-Trop is negative 


-UA show +UTI, rocephine 1gm IV ordered.


-I discussed labs/radiology results with the patient, lives alone and high risk 

of fall with fatigue and UTI, paged out to Dr. Beth or admission.


-She has rt. knee pain, limited relief from tylenol, oxycodone 5mg po ordered. 





12/31/18 16:05


-I spoke to Dr. Nicholson, covering for Dr. Beth, discussed about the 

case/labs/radiology results, agreed on the admission to his service and request 

Dr. Murphy for routine consult.


-CT head ordered


-Routine consult ordered. 





12/31/18 18:41


-CT Head: No acute intracranial abnormalities. No significant findings to 

account for the clinical presentation. No significant interval change compared 

to the prior examination(s).





- RAD Interpretation


Radiology Orders: 











12/31/18 11:06


CHEST TWO VIEWS (PA/LAT) [RAD] Stat 


KNEE W PATELLA RIGHT 3 VIEW [RAD] Stat 


TIBIA FIBULA RIGHT [RAD] Stat 














-Chest xray





Date of service: 





12/31/2018





HISTORY:


 fatigue 





COMPARISON:


05/07/2018





TECHNIQUE:


Chest PA and lateral





FINDINGS:





LUNGS:


No active pulmonary disease.





PLEURA:


No significant pleural effusion identified. No pneumothorax apparent.





CARDIOVASCULAR:


Aortic calcification





Normal cardiac size. No pulmonary vascular congestion. 





OSSEOUS STRUCTURES:


No significant abnormalities.





VISUALIZED UPPER ABDOMEN:


Normal.





OTHER FINDINGS:


None.





IMPRESSION:


No active disease.





--------------------------------------------------------------

--------------------------------------


-Rt. tibia/fibula xray





Date of service: 





12/31/2018





PROCEDURE:  Right Knee Radiographs.





HISTORY:


Fall





COMPARISON:


None.





FINDINGS:





BONES:


Normal. No fracture.  There is osteoporosis with prominent trabecula proximally.

 





JOINTS:


Normal. No osteoarthritis. 





JOINT EFFUSION:


None. 





OTHER FINDINGS:


None.





IMPRESSION:


No acute fracture





-------------------

--------------------------------------------------------------------------------


-


-Rt. knee xray





Date of service: 





12/31/2018





PROCEDURE:  Right Knee Radiographs.





HISTORY:


Fall





COMPARISON:


None.





FINDINGS:





BONES:


There is no evidence of fracture.  There is some bony sclerosis and 

calcification in the medullary canal suspicious for bone infarcts.  There is 

also osteoporosis with prominent trabecula 





JOINTS:


Normal. No osteoarthritis. 





JOINT EFFUSION:


None. 





OTHER FINDINGS:


None.





IMPRESSION:


No acute findings


------------------------------------------------------------------------------

----------------------------------------------------


CT Head:





Date of service: 





12/31/2018





PROCEDURE:  CT HEAD WITHOUT CONTRAST.





HISTORY:


Fall





COMPARISON:


12/10/2017





TECHNIQUE:


Axial computed tomography images were obtained through the head/brain without 

intravenous contrast.  





Supplemental Coronal and Sagittal projections created and reviewed.





Radiation dose:





Total exam DLP = 945.61 mGy-cm.





This CT exam was performed using one or more of the following dose reduction 

techniques: Automated exposure control, adjustment of the mA and/or kV according

 to patient size, and/or use of iterative reconstruction technique.





FINDINGS:





HEMORRHAGE:


No intracranial hemorrhage. 





BRAIN:


No mass effect or edema.  Cortical and cerebellar atrophy, periventricular small

 vessel disease. 





VENTRICLES:


Unremarkable. No hydrocephalus. 





CALVARIUM:


Unremarkable.





PARANASAL SINUSES:


Unremarkable as visualized. No significant inflammatory changes.





MASTOID AIR CELLS:


Partially opacified left mastoid air cells





OTHER FINDINGS:


None.





IMPRESSION:


No acute intracranial abnormalities. No significant findings to account for the 

clinical presentation. No significant interval change compared to the prior 

examination(s).





: Radiologist





- Medication Orders


Current Medication Orders: 











Acetaminophen (Tylenol 325mg Tab)  650 mg PO STAT STA


   Stop: 12/31/18 11:07


Tetanus/Reduced Diphtheria/Acell Pertussis (Boostrix Vaccine Inj)  0.5 ml IM 

.ONCE ONE


   Stop: 12/31/18 11:07











- PA / NP / Resident Statement


MD/DO has reviewed & agrees with the documentation as recorded.





Disposition/Present on Arrival





- Present on Arrival


Any Indicators Present on Arrival: No


History of DVT/PE: Yes


History of Uncontrolled Diabetes: No


Urinary Catheter: No


History of Decub. Ulcer: No


History Surgical Site Infection Following: None





- Disposition


Have Diagnosis and Disposition been Completed?: Yes


Diagnosis: 


 UTI (urinary tract infection), Fatigue, Skin tear, Knee pain, Non compliance 

with medical treatment





Disposition: HOSPITALIZED


Disposition Time: 15:06


Patient Plan: Observation


Patient Problems: 


                             Current Active Problems











Problem Status Onset


 


Skin tear Acute 


 


UTI (urinary tract infection) Acute 


 


Fatigue Acute 


 


Knee pain Acute 


 


Non compliance with medical treatment Acute 











Condition: STABLE

## 2018-12-31 NOTE — CARD
--------------- APPROVED REPORT --------------





Date of service: 12/31/2018



EKG Measurement

Heart [id number]

DE 140P10

[id number]

[id number]

[id number]



<Conclusion>

Sinus bradycardia with premature atrial complexes

Otherwise normal ECG

## 2018-12-31 NOTE — RAD
Date of service: 



12/31/2018



PROCEDURE:  Right Knee Radiographs.



HISTORY:

Fall



COMPARISON:

None.



FINDINGS:



BONES:

Normal. No fracture.  There is osteoporosis with prominent trabecula 

proximally. 



JOINTS:

Normal. No osteoarthritis. 



JOINT EFFUSION:

None. 



OTHER FINDINGS:

None.



IMPRESSION:

No acute fracture

## 2018-12-31 NOTE — RAD
Date of service: 



12/31/2018



HISTORY:

 fatigue 



COMPARISON:

05/07/2018



TECHNIQUE:

Chest PA and lateral



FINDINGS:



LUNGS:

No active pulmonary disease.



PLEURA:

No significant pleural effusion identified. No pneumothorax apparent.



CARDIOVASCULAR:

Aortic calcification



Normal cardiac size. No pulmonary vascular congestion. 



OSSEOUS STRUCTURES:

No significant abnormalities.



VISUALIZED UPPER ABDOMEN:

Normal.



OTHER FINDINGS:

None.



IMPRESSION:

No active disease.

## 2018-12-31 NOTE — CP.PCM.PCO
Physician Communication Note





- Physician Communication Note


Physician Communication Note: Nursing Paged resident 12/31/18 - 2250





Addendum


Addendum: 





12/31/18 23:45


Nursing paged resident on 12/31/18 at 2250 to inform that patient was having 

severe pain in her RLE


Chart reviewed and revealed patient had a fall and had trauma to her R knee


Upon further review patient still c/o pain despite being given oxycodone and 

tramadol


Resident advised to apply ice pack to extremity and elevate affected extremity; 


Labs reviewed and patient had good kidney function; x1 Toradol 30mg IVP was 

admin. 





Upon reevaluation of patient at 2340 it was noted that she was still in severe 

pain


Will admin lidoderm topical patch to affected area and reassess.

## 2018-12-31 NOTE — CT
Date of service: 



12/31/2018



PROCEDURE:  CT HEAD WITHOUT CONTRAST.



HISTORY:

Fall



COMPARISON:

12/10/2017



TECHNIQUE:

Axial computed tomography images were obtained through the head/brain 

without intravenous contrast.  



Supplemental Coronal and Sagittal projections created and reviewed.



Radiation dose:



Total exam DLP = 945.61 mGy-cm.



This CT exam was performed using one or more of the following dose 

reduction techniques: Automated exposure control, adjustment of the 

mA and/or kV according to patient size, and/or use of iterative 

reconstruction technique.



FINDINGS:



HEMORRHAGE:

No intracranial hemorrhage. 



BRAIN:

No mass effect or edema.  Cortical and cerebellar atrophy, 

periventricular small vessel disease. 



VENTRICLES:

Unremarkable. No hydrocephalus. 



CALVARIUM:

Unremarkable.



PARANASAL SINUSES:

Unremarkable as visualized. No significant inflammatory changes.



MASTOID AIR CELLS:

Partially opacified left mastoid air cells



OTHER FINDINGS:

None.



IMPRESSION:

No acute intracranial abnormalities. No significant findings to 

account for the clinical presentation. No significant interval change 

compared to the prior examination(s).

## 2018-12-31 NOTE — RAD
Date of service: 



12/31/2018



PROCEDURE:  Right Knee Radiographs.



HISTORY:

Fall



COMPARISON:

None.



FINDINGS:



BONES:

There is no evidence of fracture.  There is some bony sclerosis and 

calcification in the medullary canal suspicious for bone infarcts.  

There is also osteoporosis with prominent trabecula 



JOINTS:

Normal. No osteoarthritis. 



JOINT EFFUSION:

None. 



OTHER FINDINGS:

None.



IMPRESSION:

No acute findings

## 2019-01-01 RX ADMIN — PANTOPRAZOLE SODIUM SCH MG: 40 TABLET, DELAYED RELEASE ORAL at 06:22

## 2019-01-01 RX ADMIN — CEFTRIAXONE SCH MLS/HR: 1 INJECTION, SOLUTION INTRAVENOUS at 10:25

## 2019-01-01 RX ADMIN — Medication SCH MG: at 10:25

## 2019-01-01 NOTE — CON
NEUROLOGY CONSULTATION



DATE:  01/01/2019



CHIEF COMPLIANT:  Fatigue and fall.



CURRENT HISTORY OF PRESENT ILLNESS:  This is a 78-year-old woman with

history of PE, right hip fracture, NSTEMI, and hypertension who was

admitted to [facility] yesterday for status post fall while

getting into the car, hit the knee as well as some mild urinary tract

infection.  Currently had low blood pressure also noted over 80/60, but the

patient is asymptomatic.  Denies any dizziness.  The patient is definitely

mildly deconditioned.  _____ fatigue and some low systolic and diastolic

blood pressures and her fall was mechanical in nature from underlying

deconditioned state.



PAST MEDICAL HISTORY:  As above.



SOCIAL HISTORY:  No illicit drug use, smoking or ETOH abuse.



ALLERGIES:  NO KNOWN DRUG ALLERGIES.



FAMILY HISTORY:  Noncontributory.



MEDICATIONS:  Reviewed by nurse's reconciliation sheet.



REVIEW OF SYSTEMS:  Fourteen-point review of systems is negative except as

per HPI.



PHYSICAL EXAMINATION:

GENERAL:  The patient is seen in bed, in no acute distress.

VITAL SIGNS:  Temperature 98.4, pulse rate of 54, blood pressure of 82/40,

and respiratory rate 18.

HEENT:  Atraumatic and normocephalic.  PERRLA.  Extraocular muscles intact.

NECK:  Supple.  No JVD.  No adenopathy noted.

LUNGS:  Clear to auscultation.  No adventitious sounds.

HEART:  S1 and S2.  Normal rate and rhythm.  No murmurs, rubs or gallops.

ABDOMEN:  Soft, nontender, and nondistended.  Bowel sounds are present.

EXTREMITIES:  No clubbing.  No cyanosis.  Peripheral pulses 2+ felt

bilaterally.

NEUROLOGIC:  The patient is alert and oriented to person, place, month, and

year.  Speech is fluent without any errors.  Cranial nerves II through XII

are intact.  Motor exam; moves all extremities equally.  Toes are downgoing

bilaterally.  Sensory exam; light touch, pinprick, proprioception, and

vibration intact.  DTRs are 2+ throughout.  Coordination; finger-to-nose is

intact.  No dysmetria noted.



IMPRESSION:  Fatigue is likely secondary hypotensive episode, superimposed

underlying deconditioned state, which also has led to her falls, which is

mechanical in nature and from underlying deconditioned state as well.



RECOMMENDATIONS:  At this time:

1.  We will recommend Lidoderm patch to the area of pain.

2.  Adequate hydration since she has low systolic and diastolic blood

pressures and PT and OT evaluation.



Thank you for this consult.







__________________________________________

Sylvester Murphy MD





DD:  01/01/2019 16:57:19

DT:  01/01/2019 18:02:47

Job # [id number]

## 2019-01-01 NOTE — PN
DATE:  01/01/2019



SUBJECTIVE:  The patient is a 78-year-old female.  She is comfortable. 

Today, she is on the bed.  No distress.  She has no other complaints.  No

fever.  No nausea.  No vomiting.



PHYSICAL EXAMINATION:

VITAL SIGNS:  Temperature 98.3, heart rate 63, blood pressure 165/88,

respirations 20, and saturation 95% on room air.

HEAD AND NECK:  Normal.  No JVD.  No thyromegaly.

CHEST:  Clear bilateral.

CARDIAC:  First sound and second sound normal.

ABDOMEN:  Soft. obese, and nontender.

EXTREMITIES:  No edema.

NEUROLOGIC:  Normal.  The patient moves all extremities.



IMPRESSION AND PLAN:

1.  Status post mechanical fall.  We will continue observation and physical

therapy.  Continue gastrointestinal and deep venous thrombosis prophylaxis.

2.  Urinary tract infection.  Continue Rocephin IV.

3.  Coronary artery disease.  Continue Lipitor and continue beta blocker

and metoprolol.

 4.  The patient has arthritis.  _____ tramadol.  We will see how she do,

and we will follow up clinically.  We will continue gastrointestinal and

deep venous thrombosis prophylaxis.  Repeat laboratory in the morning.  We

will consider ultrasound of the legs in the morning.  Continue current

therapy.





__________________________________________

Tam Nicholson MD





DD:  01/01/2019 21:19:19

DT:  01/01/2019 22:49:38

Job # [id number]

## 2019-01-01 NOTE — CP.PCM.PN
Subjective





- Date & Time of Evaluation


Date of Evaluation: 01/01/19


Time of Evaluation: 16:31





- Subjective


Subjective: 





House Doc Note:





Called for low blood pressure





This is a 78 year old female with PMH PE, right hip fracture, NSTEMI, HTN, 

admitted to [facility] yesterday s/p fall, UTI. Currently, nurse states that she had a 

low BP 80/60, patient is asymptomatic, denies dizziness, nausea, vomiting, chest

pain, shortness of breath, altered mentation, neck pain, fevers, abdominal pain,

lower extremity swelling. Patient states that pain from her falls, right knee, 

is currently controlled. Patient states that she is eating well at the hospital.

I did the blood pressure manually, found to be 100/60 mmHg.





VS


T 97.6F   HR 57, /60, RR 18, 96% RA


Neuro: AAOx4


Resp: CTA b/l. No wheezes, rhonchi.


Cardio: S1, S2, RRR. no murmurs, rubs, gallops.


Abd: soft, nontender, nondistended


Extremities: no edema, no calf tenderness.





A/P:





Hypotension 2/2 pain medication





- Will hold tramadol, lopressor for now


- Tylenol prn pain. Lidoderm patches


- 500 cc IVF bolus now


- recheck BP after bolus


- will increase maintenance IVF to 100 cc


- Discussed case with Dr Nicholson, who is covering for PMD Dr Beth.





Belinda Hardy, PGY2 





Objective





- Vital Signs/Intake and Output


Vital Signs (last 24 hours): 


                                        











Temp Pulse Resp BP Pulse Ox


 


 97.6 F   57 L  18   82/40 L  94 L


 


 01/01/19 16:15  01/01/19 16:15  01/01/19 16:15  01/01/19 16:15  01/01/19 16:15








Intake and Output: 


                                        











 01/01/19 01/01/19





 06:59 18:59


 


Intake Total 982 


 


Output Total 200 


 


Balance 782 














- Medications


Medications: 


                               Current Medications





Atorvastatin Calcium (Lipitor)  80 mg PO DIN PATRICK


Sodium Chloride (Sodium Chloride 0.9%)  1,000 mls @ 75 mls/hr IV .[id number] PATRICK


   Last Admin: 12/31/18 19:32 Dose:  75 mls/hr


Ceftriaxone Sodium (Rocephin 1 Gram Ivpb)  1 gm in 100 mls @ 100 mls/hr IVPB 

DAILY [facility]; Protocol


   Last Admin: 01/01/19 10:25 Dose:  100 mls/hr


Metoprolol Tartrate (Lopressor)  25 mg PO DAILY [facility]


   Last Admin: 01/01/19 10:25 Dose:  25 mg


Pantoprazole Sodium (Protonix Ec Tab)  40 mg PO 0600 [facility]


   Last Admin: 01/01/19 06:22 Dose:  40 mg


Polysaccharide Iron Complex (Ferrex-150)  150 mg PO DAILY [facility]


   Last Admin: 01/01/19 10:25 Dose:  150 mg


Tramadol HCl (Ultram)  50 mg PO Q8H PRN


   PRN Reason: Pain, moderate (4-7)


   Last Admin: 01/01/19 15:31 Dose:  50 mg











- Labs


Labs: 


                                        





                                 12/31/18 11:20 





                                 12/31/18 11:20 





                                        











PT  10.6 SECONDS (9.4-12.5)   12/31/18  11:20    


 


INR  0.92   12/31/18  11:20    


 


APTT  28.6 Seconds (25.1-36.5)   12/31/18  11:20

## 2019-01-02 LAB
BUN SERPL-MCNC: 15 MG/DL (ref 7–21)
CALCIUM SERPL-MCNC: 8.6 MG/DL (ref 8.4–10.5)
GFR NON-AFRICAN AMERICAN: > 60

## 2019-01-02 RX ADMIN — PANTOPRAZOLE SODIUM SCH: 40 TABLET, DELAYED RELEASE ORAL at 05:47

## 2019-01-02 RX ADMIN — PANTOPRAZOLE SODIUM SCH MG: 40 TABLET, DELAYED RELEASE ORAL at 06:09

## 2019-01-02 NOTE — HP
DATE OF EXAM:  12/31/2018



REASON FOR ADMISSION:  She fell on the floor.



HISTORY OF PRESENT ILLNESS:  The patient came into the emergency room,

brought by relative.  This is a 78-year-old with history of GI bleed in the

past, colitis, hip fracture, non-ST elevation MI, came in complaining of

the fall, hit the right knee an hour ago.  The patient states she denied

any chest pain, palpitation, dizziness or short of breath, fever, diarrhea,

nausea, or vomiting.  She came into the ER for evaluation and she denied

any other complaint.  The patient has a mechanical fall, mainly her foot

stuck on the ground and that is why she fell.  She has no syncope or any

cardiovascular or neurologic-associated symptoms.



PAST MEDICAL HISTORY:  As I mentioned above, she does have a history of GI

bleeding, MI, hip fractures, hypertension, and non-ST elevation MI.



ALLERGIES:  SHE HAS NO KNOWN ALLERGY.



SOCIAL HISTORY:  No smoking.  No drinking.



FAMILY HISTORY:  Noncontributory.



REVIEW OF SYSTEMS:  As in the present illness, she always complain of some

pain in the back, leg, difficulty ambulation _____.  No chest pain.  No

short of breath except going upstairs.  No urine incontinence.  No rectal

bleeding.



PHYSICAL EXAMINATION:

GENERAL:  The patient was seen in the emergency room at known admissions on

12/31/2018.

VITAL SIGNS:  Temperature 99.1, heart rate 60, blood pressure 131/56,

respirations 18, sat 97%.

HEAD AND NECK:  Normal.  No JVD.  No thyromegaly.

CHEST:  Clear bilateral.

CARDIAC:  First sound and second sound normal.

ABDOMEN:  Soft, obese, nontender.

EXTREMITIES:  There is no edema, but there is right knee pain and tender

from the fall.

NEUROLOGICAL:  Shows moving all extremities.



LABORATORY DATA:  White count 10.6, hemoglobin _____ 10.6, hematocrit 33.7,

platelets 348, although chemistry; sodium 139, potassium 5.4, chloride 112,

bicarb 15 which is low, BUN 21, creatinine 1.2.  Liver function test is

normal.  Troponin is 0.01.  Total protein 8.4, albumin 4.9.  PT, PTT is

normal.  Her urinalysis shows 15 to 20 white cells and 10 to 15 red blood

cells with positive nitrites.



The patient has multiple x-rays, negative for fractures.  CT of the head

was done and it was negative.  The patient also had electrocardiogram

_____, negative for acute bleed; no change from previous one.  _____ and

electrocardiogram read by Dr. Zhou, and there was no sinus bradycardia, QRS

complexes, otherwise normal.



IMPRESSION AND PLAN:

1.  Status post fall, knee pain, arthritis.  We will admit the patient for

observation.  She may need to be evaluated by physical therapy for gait

training.

2.  Urinary tract infections.  We will start the patient on Rocephin 1 g,

still awaiting for cultures.  We will wait for cultures too.

3.  History of coronary artery disease.  Continue Lopressor.

4.  History of hypercholesterolemia.  Continue Lipitor.  We are going to

add baby aspirin and we will follow up clinically.  We will repeat lab in

the morning.







__________________________________________

Tam Nicholson MD





DD:  01/01/2019 21:16:28

DT:  01/02/2019 4:08:48

Job # [id number]

## 2019-01-02 NOTE — US
HISTORY:

Leg pain and swelling. Evaluate for DVT



PHYSICIAN(S):  Mathew Seaman MD.



TECHNIQUE:

Duplex sonography and color-flow Doppler with graded compression were 

used to evaluate the deep venous systems of both lower extremities. 



FINDINGS:

The visualized deep venous systems of both lower extremities are 

sonographically normal and compressible. Normal wave forms and 

augmentation are seen. There is no sonographic evidence for deep 

venous thrombosis in the visualized segments of both lower 

extremities.



IMPRESSION:

No sonographic evidence for deep venous thrombosis in the visualized 

segments of both lower extremities.

## 2019-01-02 NOTE — US
PROCEDURE:  Lower extremity DILLON exam



HISTORY:

Peripheral vascular disease with ischemic pain.  Smoker. 



PHYSICIAN(S):  Mathew Seaman MD.



FINDINGS:

The resting DILLON's are severely abnormal: Right, 0.27 and left, 0.26 



There is a 60 mm gradient between the brachial pressures, lower on 

the left.  This is consistent with left subclavian occlusive disease 



 mm gradients are noted between the arm and high thigh 

pressures.  This is consistent with aortoiliac disease.  The PVR 

waveforms are moderately blunted but symmetric 



The calf PVR waveforms augment on the right.  There moderately 

blunted on the left.  This is consistent with left SFA occlusive 

disease. 



The ankle and metatarsal waveforms are severely blunted.  This is 

consistent with bilateral tibial disease. 



IMPRESSION:

1. Severely abnormal ABIs at rest. 



2.  Aortoiliac disease. 



3.  Left SFA occlusive disease. 



4.  Bilateral tibial disease. 



5.  Left subclavian occlusive disease. 



6.  If further evaluation is indicated, an MRA with gadolinium runoff 

or CTA runoff can be obtained.

## 2019-01-02 NOTE — CP.PCM.PN
Subjective





- Date & Time of Evaluation


Date of Evaluation: 01/02/19


Time of Evaluation: 09:45





- Subjective


Subjective: 





unable to walk secondary to pain in leg s/p fall





Objective





- Vital Signs/Intake and Output


Vital Signs (last 24 hours): 


                                        











Temp Pulse Resp BP Pulse Ox


 


 97 F L  64   16   107/52 L  94 L


 


 01/02/19 16:35  01/02/19 16:35  01/02/19 16:35  01/02/19 16:35  01/02/19 16:35








Intake and Output: 


                                        











 01/02/19 01/02/19





 06:59 18:59


 


Intake Total 1820 


 


Output Total 1550 


 


Balance 270 














- Medications


Medications: 


                               Current Medications





Acetaminophen (Tylenol 325mg Tab)  650 mg PO Q6H PRN


   PRN Reason: Pain, moderate (4-7)


Atorvastatin Calcium (Lipitor)  80 mg PO DIN [facility]


   Last Admin: 01/01/19 18:19 Dose:  80 mg


Enoxaparin Sodium (Lovenox)  30 mg SC DAILY [facility]; Protocol


Ceftriaxone Sodium (Rocephin 1 Gram Ivpb)  1 gm in 100 mls @ 100 mls/hr IVPB 

DAILY [facility]; Protocol


   Last Admin: 01/01/19 10:25 Dose:  100 mls/hr


Sodium Chloride (Sodium Chloride 0.9%)  1,000 mls @ 100 mls/hr IV .Q10H [facility]


   Last Admin: 01/02/19 03:59 Dose:  100 mls/hr


Lidocaine (Lidoderm)  1 ea TD DAILY [facility]


Metoprolol Tartrate (Lopressor)  25 mg PO DAILY [facility]


   Last Admin: 01/01/19 10:25 Dose:  25 mg


Pantoprazole Sodium (Protonix Ec Tab)  40 mg PO 0600 [facility]


   Last Admin: 01/02/19 06:09 Dose:  40 mg


Polysaccharide Iron Complex (Ferrex-150)  150 mg PO DAILY [facility]


   Last Admin: 01/01/19 10:25 Dose:  150 mg


Tramadol HCl (Ultram)  50 mg PO Q8H PRN


   PRN Reason: Pain, moderate (4-7)


   Last Admin: 01/01/19 15:31 Dose:  50 mg











- Labs


Labs: 


                                        





                                 12/31/18 11:20 





                                 01/02/19 06:15 





                                        











PT  10.6 SECONDS (9.4-12.5)   12/31/18  11:20    


 


INR  0.92   12/31/18  11:20    


 


APTT  28.6 Seconds (25.1-36.5)   12/31/18  11:20    














- Respiratory Exam


Respiratory Exam: Clear to Ausculation Bilateral, NORMAL BREATHING PATTERN





- Cardiovascular Exam


Cardiovascular Exam: REGULAR RHYTHM





- GI/Abdominal Exam


GI & Abdominal Exam: Soft, Normal Bowel Sounds





- Back Exam


Back Exam: tenderness





- Neurological Exam


Neurological Exam: Abnormal Gait, Alert, Awake





Assessment and Plan


(1) Knee pain


Status: Acute   





(2) UTI (urinary tract infection)


Status: Acute   





- Assessment and Plan (Free Text)


Plan: 





IV Rocephin ordered, PT eval/SW for DC planning

## 2019-01-03 LAB
ERYTHROCYTE [DISTWIDTH] IN BLOOD BY AUTOMATED COUNT: 14.9 % (ref 11.5–14.5)
HGB BLD-MCNC: 8.7 G/DL (ref 12–16)
MCH RBC QN AUTO: 33 PG (ref 25–35)
MCHC RBC AUTO-ENTMCNC: 30.7 G/DL (ref 31–37)
MCV RBC AUTO: 107.2 FL (ref 80–105)
PLATELET # BLD: 278 10^3/UL (ref 120–450)
PMV BLD AUTO: 10.4 FL (ref 7–11)
RBC # BLD AUTO: 2.64 10^6/UL (ref 3.5–6.1)
WBC # BLD AUTO: 9.7 10^3/UL (ref 4.5–11)

## 2019-01-03 RX ADMIN — Medication SCH MG: at 09:36

## 2019-01-03 RX ADMIN — ENOXAPARIN SODIUM SCH MG: 30 INJECTION SUBCUTANEOUS at 09:37

## 2019-01-03 RX ADMIN — CEFTRIAXONE SCH MLS/HR: 1 INJECTION, SOLUTION INTRAVENOUS at 09:37

## 2019-01-03 RX ADMIN — PANTOPRAZOLE SODIUM SCH MG: 40 TABLET, DELAYED RELEASE ORAL at 05:30

## 2019-01-03 NOTE — CP.PCM.PN
Subjective





- Date & Time of Evaluation


Date of Evaluation: 01/03/19


Time of Evaluation: 09:10





- Subjective


Subjective: 





c/o pain right knee/ unable to ambulate





Objective





- Vital Signs/Intake and Output


Vital Signs (last 24 hours): 


                                        











Temp Pulse Resp BP Pulse Ox


 


 98.1 F   57 L  20   113/59 L  95 


 


 01/03/19 08:20  01/03/19 08:20  01/03/19 08:20  01/03/19 08:20  01/03/19 08:20








Intake and Output: 


                                        











 01/03/19 01/03/19





 06:59 18:59


 


Intake Total 1440 


 


Output Total 350 


 


Balance 1090 














- Medications


Medications: 


                               Current Medications





Acetaminophen (Tylenol 325mg Tab)  650 mg PO Q6H PRN


   PRN Reason: Pain, moderate (4-7)


   Last Admin: 01/03/19 03:34 Dose:  650 mg


Atorvastatin Calcium (Lipitor)  80 mg PO DIN [facility]


   Last Admin: 01/02/19 18:03 Dose:  80 mg


Enoxaparin Sodium (Lovenox)  30 mg SC DAILY [facility]; Protocol


   Last Admin: 01/03/19 09:37 Dose:  30 mg


Lidocaine (Lidoderm)  1 ea TD DAILY [facility]


   Last Admin: 01/03/19 09:36 Dose:  1 ea


Metoprolol Tartrate (Lopressor)  25 mg PO DAILY [facility]


   Last Admin: 01/01/19 10:25 Dose:  25 mg


Pantoprazole Sodium (Protonix Ec Tab)  40 mg PO 0600 [facility]


   Last Admin: 01/03/19 05:30 Dose:  40 mg


Polysaccharide Iron Complex (Ferrex-150)  150 mg PO DAILY [facility]


   Last Admin: 01/03/19 09:36 Dose:  150 mg


Tramadol HCl (Ultram)  50 mg PO Q8H PRN


   PRN Reason: Pain, moderate (4-7)


   Last Admin: 01/01/19 15:31 Dose:  50 mg











- Labs


Labs: 


                                        





                                 01/03/19 09:45 





                                 01/02/19 06:15 





                                        











PT  10.6 SECONDS (9.4-12.5)   12/31/18  11:20    


 


INR  0.92   12/31/18  11:20    


 


APTT  28.6 Seconds (25.1-36.5)   12/31/18  11:20    














- Respiratory Exam


Respiratory Exam: Clear to Ausculation Bilateral, NORMAL BREATHING PATTERN





- GI/Abdominal Exam


GI & Abdominal Exam: Soft, Normal Bowel Sounds





- Neurological Exam


Neurological Exam: Abnormal Gait, Alert, Awake





- Skin


Skin Exam: Dry, Warm





Assessment and Plan


(1) Knee pain


Status: Acute   





(2) UTI (urinary tract infection)


Status: Acute   





- Assessment and Plan (Free Text)


Plan: 





DC IV Abx & observe for S&S of UTI, pt with probable asymptomatic bactiuria, uri

ne + proteus, PT eval and tx, SW for DC planning

## 2019-01-04 RX ADMIN — ENOXAPARIN SODIUM SCH MG: 30 INJECTION SUBCUTANEOUS at 10:03

## 2019-01-04 RX ADMIN — Medication SCH MG: at 10:03

## 2019-01-04 RX ADMIN — ENOXAPARIN SODIUM SCH MG: 30 INJECTION SUBCUTANEOUS at 12:23

## 2019-01-04 RX ADMIN — PANTOPRAZOLE SODIUM SCH MG: 40 TABLET, DELAYED RELEASE ORAL at 05:59

## 2019-01-04 NOTE — CP.PCM.PN
Subjective





- Date & Time of Evaluation


Date of Evaluation: 01/04/19


Time of Evaluation: 10:30





- Subjective


Subjective: 





OOB ambulating with PT, NAD





Objective





- Vital Signs/Intake and Output


Vital Signs (last 24 hours): 


                                        











Temp Pulse Resp BP Pulse Ox


 


 97.6 F   66   18   109/53 L  97 


 


 01/04/19 16:39  01/04/19 16:39  01/04/19 16:39  01/04/19 16:39  01/04/19 16:39








Intake and Output: 


                                        











 01/04/19 01/04/19





 06:59 18:59


 


Intake Total 240 240


 


Balance 240 240














- Medications


Medications: 


                               Current Medications





Acetaminophen (Tylenol 325mg Tab)  650 mg PO Q6H PRN


   PRN Reason: Pain, moderate (4-7)


   Last Admin: 01/04/19 10:03 Dose:  650 mg


Atorvastatin Calcium (Lipitor)  80 mg PO DIN [facility]


   Last Admin: 01/03/19 17:48 Dose:  80 mg


Enoxaparin Sodium (Lovenox)  30 mg SC DAILY [facility]; Protocol


   Last Admin: 01/04/19 12:23 Dose:  30 mg


Lidocaine (Lidoderm)  1 ea TD DAILY [facility]


   Last Admin: 01/04/19 10:04 Dose:  1 ea


Metoprolol Tartrate (Lopressor)  25 mg PO DAILY [facility]


   Last Admin: 01/01/19 10:25 Dose:  25 mg


Pantoprazole Sodium (Protonix Ec Tab)  40 mg PO 0600 [facility]


   Last Admin: 01/04/19 05:59 Dose:  40 mg


Polysaccharide Iron Complex (Ferrex-150)  150 mg PO DAILY [facility]


   Last Admin: 01/04/19 10:03 Dose:  150 mg


Tramadol HCl (Ultram)  50 mg PO Q8H PRN


   PRN Reason: Pain, moderate (4-7)


   Last Admin: 01/01/19 15:31 Dose:  50 mg











- Labs


Labs: 


                                        





                                 01/03/19 09:45 





                                 01/02/19 06:15 





                                        











PT  10.6 SECONDS (9.4-12.5)   12/31/18  11:20    


 


INR  0.92   12/31/18  11:20    


 


APTT  28.6 Seconds (25.1-36.5)   12/31/18  11:20    














- Respiratory Exam


Respiratory Exam: Clear to Ausculation Bilateral, NORMAL BREATHING PATTERN





- Cardiovascular Exam


Cardiovascular Exam: REGULAR RHYTHM





- GI/Abdominal Exam


GI & Abdominal Exam: Soft, Normal Bowel Sounds





- Neurological Exam


Neurological Exam: Alert, Awake





- Skin


Skin Exam: Dry, Warm





Assessment and Plan


(1) Knee pain


Status: Acute   





(2) UTI (urinary tract infection)


Status: Acute   





- Assessment and Plan (Free Text)


Plan: 





continue PT/SW for DC planning

## 2019-01-05 RX ADMIN — ENOXAPARIN SODIUM SCH: 30 INJECTION SUBCUTANEOUS at 10:18

## 2019-01-05 RX ADMIN — Medication SCH MG: at 10:17

## 2019-01-05 RX ADMIN — PANTOPRAZOLE SODIUM SCH MG: 40 TABLET, DELAYED RELEASE ORAL at 06:58

## 2019-01-05 NOTE — CP.PCM.PN
Subjective





- Date & Time of Evaluation


Date of Evaluation: 01/05/19


Time of Evaluation: 09:10





- Subjective


Subjective: 





no complaints, no melena, no BRBPR, no abdominal pain





Objective





- Vital Signs/Intake and Output


Vital Signs (last 24 hours): 


                                        











Temp Pulse Resp BP Pulse Ox


 


 98.3 F   60   20   126/67   97 


 


 01/05/19 06:00  01/05/19 06:00  01/05/19 06:00  01/05/19 06:00  01/05/19 06:00








Intake and Output: 


                                        











 01/05/19 01/05/19





 06:59 18:59


 


Intake Total 0 


 


Output Total 150 


 


Balance -150 














- Medications


Medications: 


                               Current Medications





Acetaminophen (Tylenol 325mg Tab)  650 mg PO Q6H PRN


   PRN Reason: Pain, moderate (4-7)


   Last Admin: 01/04/19 18:10 Dose:  650 mg


Atorvastatin Calcium (Lipitor)  80 mg PO DIN [facility]


   Last Admin: 01/04/19 18:10 Dose:  80 mg


Lidocaine (Lidoderm)  1 ea TD DAILY [facility]


   Last Admin: 01/04/19 10:04 Dose:  1 ea


Metoprolol Tartrate (Lopressor)  25 mg PO DAILY [facility]


   Last Admin: 01/01/19 10:25 Dose:  25 mg


Pantoprazole Sodium (Protonix Ec Tab)  40 mg PO 0600 [facility]


   Last Admin: 01/05/19 06:58 Dose:  40 mg


Polysaccharide Iron Complex (Ferrex-150)  150 mg PO DAILY [facility]


   Last Admin: 01/04/19 10:03 Dose:  150 mg


Tramadol HCl (Ultram)  50 mg PO Q8H PRN


   PRN Reason: Pain, moderate (4-7)


   Last Admin: 01/01/19 15:31 Dose:  50 mg











- Labs


Labs: 


                                        





                                 01/03/19 09:45 





                                 01/02/19 06:15 





                                        











PT  10.6 SECONDS (9.4-12.5)   12/31/18  11:20    


 


INR  0.92   12/31/18  11:20    


 


APTT  28.6 Seconds (25.1-36.5)   12/31/18  11:20    














- Respiratory Exam


Respiratory Exam: Clear to Ausculation Bilateral, NORMAL BREATHING PATTERN





- Cardiovascular Exam


Cardiovascular Exam: REGULAR RHYTHM





- GI/Abdominal Exam


GI & Abdominal Exam: Soft, Normal Bowel Sounds





- Neurological Exam


Neurological Exam: Abnormal Gait, Alert, Awake





Assessment and Plan


(1) Knee pain


Status: Acute   





(2) UTI (urinary tract infection)


Status: Acute   





(3) Anemia


Status: Chronic   





- Assessment and Plan (Free Text)


Plan: 





check labs, Hb/Hct in am, continue PT, SW for DC planning

## 2019-01-06 LAB
ALBUMIN SERPL-MCNC: 3.6 G/DL (ref 3–4.8)
ALBUMIN/GLOB SERPL: 1.2 {RATIO} (ref 1.1–1.8)
ALT SERPL-CCNC: 34 U/L (ref 7–56)
AST SERPL-CCNC: 29 U/L (ref 14–36)
BASOPHILS # BLD AUTO: 0.08 K/MM3 (ref 0–2)
BASOPHILS NFR BLD: 0.8 % (ref 0–3)
BUN SERPL-MCNC: 12 MG/DL (ref 7–21)
CALCIUM SERPL-MCNC: 9.2 MG/DL (ref 8.4–10.5)
EOSINOPHIL # BLD: 0.3 10*3/UL (ref 0–0.7)
EOSINOPHIL NFR BLD: 2.9 % (ref 1.5–5)
ERYTHROCYTE [DISTWIDTH] IN BLOOD BY AUTOMATED COUNT: 14.4 % (ref 11.5–14.5)
GFR NON-AFRICAN AMERICAN: > 60
GRANULOCYTES # BLD: 6.35 10*3/UL (ref 1.4–6.5)
GRANULOCYTES NFR BLD: 66.9 % (ref 50–68)
HGB BLD-MCNC: 9.6 G/DL (ref 12–16)
LYMPHOCYTES # BLD: 1 10*3/UL (ref 1.2–3.4)
LYMPHOCYTES NFR BLD AUTO: 10.8 % (ref 22–35)
MCH RBC QN AUTO: 33.1 PG (ref 25–35)
MCHC RBC AUTO-ENTMCNC: 31.3 G/DL (ref 31–37)
MCV RBC AUTO: 105.9 FL (ref 80–105)
MONOCYTES # BLD AUTO: 1.8 10*3/UL (ref 0.1–0.6)
MONOCYTES NFR BLD: 18.6 % (ref 1–6)
PLATELET # BLD: 359 10^3/UL (ref 120–450)
PMV BLD AUTO: 10.7 FL (ref 7–11)
RBC # BLD AUTO: 2.9 10^6/UL (ref 3.5–6.1)
WBC # BLD AUTO: 9.5 10^3/UL (ref 4.5–11)

## 2019-01-06 RX ADMIN — Medication SCH MG: at 09:07

## 2019-01-06 RX ADMIN — PANTOPRAZOLE SODIUM SCH MG: 40 TABLET, DELAYED RELEASE ORAL at 05:51

## 2019-01-06 NOTE — CP.PCM.PN
Subjective





- Date & Time of Evaluation


Date of Evaluation: 01/06/19


Time of Evaluation: 10:10





- Subjective


Subjective: 





NAD, no chest pain, no SOB, ambulating





Objective





- Vital Signs/Intake and Output


Vital Signs (last 24 hours): 


                                        











Temp Pulse Resp BP Pulse Ox


 


 98.3 F   55 L  18   108/61   95 


 


 01/06/19 06:00  01/06/19 06:00  01/06/19 06:00  01/06/19 06:00  01/06/19 06:00








Intake and Output: 


                                        











 01/06/19 01/06/19





 06:59 18:59


 


Intake Total 0 


 


Output Total 200 


 


Balance -200 














- Medications


Medications: 


                               Current Medications





Acetaminophen (Tylenol 325mg Tab)  650 mg PO Q6H PRN


   PRN Reason: Pain, moderate (4-7)


   Last Admin: 01/05/19 17:09 Dose:  650 mg


Atorvastatin Calcium (Lipitor)  80 mg PO DIN [facility]


   Last Admin: 01/05/19 16:39 Dose:  80 mg


Lidocaine (Lidoderm)  1 ea TD DAILY [facility]


   Last Admin: 01/06/19 09:07 Dose:  1 ea


Metoprolol Tartrate (Lopressor)  25 mg PO DAILY [facility]


   Last Admin: 01/01/19 10:25 Dose:  25 mg


Pantoprazole Sodium (Protonix Ec Tab)  40 mg PO 0600 [facility]


   Last Admin: 01/06/19 05:51 Dose:  40 mg


Polysaccharide Iron Complex (Ferrex-150)  150 mg PO DAILY [facility]


   Last Admin: 01/06/19 09:07 Dose:  150 mg


Tramadol HCl (Ultram)  50 mg PO Q8H PRN


   PRN Reason: Pain, moderate (4-7)


   Last Admin: 01/01/19 15:31 Dose:  50 mg











- Labs


Labs: 


                                        





                                 01/06/19 05:30 





                                 01/06/19 05:30 





                                        











PT  10.6 SECONDS (9.4-12.5)   12/31/18  11:20    


 


INR  0.92   12/31/18  11:20    


 


APTT  28.6 Seconds (25.1-36.5)   12/31/18  11:20    














- Respiratory Exam


Respiratory Exam: Clear to Ausculation Bilateral, NORMAL BREATHING PATTERN





- Cardiovascular Exam


Cardiovascular Exam: REGULAR RHYTHM





- GI/Abdominal Exam


GI & Abdominal Exam: Soft, Normal Bowel Sounds





- Neurological Exam


Neurological Exam: Abnormal Gait, Alert, Awake





- Skin


Skin Exam: Dry, Warm





Assessment and Plan


(1) Knee pain


Status: Acute   





(2) UTI (urinary tract infection)


Status: Acute   





(3) Anemia


Status: Chronic   





- Assessment and Plan (Free Text)


Plan: 





continue PT, SW for discharge planning, Dr. Beth to resume care of patient in 

am

## 2019-01-07 VITALS
RESPIRATION RATE: 18 BRPM | TEMPERATURE: 98 F | OXYGEN SATURATION: 96 % | HEART RATE: 64 BPM | DIASTOLIC BLOOD PRESSURE: 56 MMHG | SYSTOLIC BLOOD PRESSURE: 102 MMHG

## 2019-01-07 RX ADMIN — Medication SCH MG: at 09:07

## 2019-01-07 RX ADMIN — PANTOPRAZOLE SODIUM SCH MG: 40 TABLET, DELAYED RELEASE ORAL at 05:43

## 2019-01-08 RX ADMIN — PANTOPRAZOLE SODIUM SCH MG: 40 TABLET, DELAYED RELEASE ORAL at 05:38

## 2019-01-08 RX ADMIN — Medication SCH MG: at 10:42

## 2019-01-08 NOTE — PN
DATE:  01/07/2019





SUBJECTIVE:  The patient is a 78-year-old female.  The patient was seen and

examined at the bedside on 01/07/2019.  Looking comfortable.  Leg pain is

little bit better.  No fevers.  No chills.  No hematuria or hematochezia. 

No headache.  No dizziness.  No chest pain.  No palpitation.



PHYSICAL EXAMINATION

VITAL SIGNS:  Temperature 98.4, pulse 64, blood pressure 102/56,

respiratory rate 18, oxygenation 96.

HEENT:  Head; normocephalic and atraumatic.  Eyes; PERRLA.  Extraocular

muscles intact.  Conjunctivae clear.  Nose patent.

NECK:  Supple.  No carotid bruit, JVD, or thyromegaly.

CHEST:  Bilaterally symmetrical.

HEART:  S1, S2 positive.

LUNGS:  Clear to auscultation.

ABDOMEN:  Soft.  Bowel sounds present.  No organomegaly.

EXTREMITIES:  No edema.  No cyanosis.

NEUROLOGIC: The patient is awake and alert.  Follows simple commands.



MEDICATIONS:  Iron, Lidoderm patch, Lipitor, Lopressor, Protonix Tylenol,

and tramadol.



LABORATORY DATA:  White blood cell 11.5, hemoglobin 9.6, hematocrit 30.7,

platelets 359.  Sodium 145, potassium 4.2, BUN 12, creatinine 0.6, glucose

105.  Alkaline phosphatase 188.



ASSESSMENT AND PLAN:  Ms. Lorenza Coleman is a 78-year-old lady with

anemia, hyperchloremia, increased alkaline phosphatase, proteinuria,

hematuria, urinary tract infection, status post mechanical fall.  Getting

physical therapy.  Got Rocephin for urinary tract infection, coronary

artery disease, hypercholesterolemia, getting Lipitor, beta blocker and

metoprolol.  Degenerative joint disease, getting tramadol. 

Gastrointestinal and deep venous thrombosis prophylaxis.  Discussion done

with the patient.  We will try to consider Transitional Care Unit to

increase the strength.  The patient lives alone by herself.   is not

able to do her activities of daily living.  We will repeat labs.  We will

follow up.







__________________________________________

Alejandrina Beth MD



DD:  01/08/2019 0:06:13

DT:  01/08/2019 0:14:15

Job # [id number]

[facility]